# Patient Record
Sex: FEMALE | URBAN - METROPOLITAN AREA
[De-identification: names, ages, dates, MRNs, and addresses within clinical notes are randomized per-mention and may not be internally consistent; named-entity substitution may affect disease eponyms.]

---

## 2024-02-27 ENCOUNTER — LAB REQUISITION (OUTPATIENT)
Dept: LAB | Facility: CLINIC | Age: 79
End: 2024-02-27

## 2024-02-27 DIAGNOSIS — I48.0 PAROXYSMAL ATRIAL FIBRILLATION (H): ICD-10-CM

## 2024-02-28 ENCOUNTER — LAB REQUISITION (OUTPATIENT)
Dept: LAB | Facility: CLINIC | Age: 79
End: 2024-02-28
Payer: COMMERCIAL

## 2024-02-28 DIAGNOSIS — I48.0 PAROXYSMAL ATRIAL FIBRILLATION (H): ICD-10-CM

## 2024-02-28 LAB — INR PPP: 2.09 (ref 0.85–1.15)

## 2024-02-28 PROCEDURE — P9604 ONE-WAY ALLOW PRORATED TRIP: HCPCS | Mod: ORL | Performed by: FAMILY MEDICINE

## 2024-02-28 PROCEDURE — 85610 PROTHROMBIN TIME: CPT | Mod: ORL | Performed by: FAMILY MEDICINE

## 2024-02-28 PROCEDURE — 36415 COLL VENOUS BLD VENIPUNCTURE: CPT | Mod: ORL | Performed by: FAMILY MEDICINE

## 2024-03-12 ENCOUNTER — LAB REQUISITION (OUTPATIENT)
Dept: LAB | Facility: CLINIC | Age: 79
End: 2024-03-12
Payer: COMMERCIAL

## 2024-03-12 DIAGNOSIS — Z79.01 LONG TERM (CURRENT) USE OF ANTICOAGULANTS: ICD-10-CM

## 2024-03-13 LAB — INR PPP: 1.38 (ref 0.85–1.15)

## 2024-03-13 PROCEDURE — 36415 COLL VENOUS BLD VENIPUNCTURE: CPT | Mod: ORL | Performed by: FAMILY MEDICINE

## 2024-03-13 PROCEDURE — 85610 PROTHROMBIN TIME: CPT | Mod: ORL | Performed by: FAMILY MEDICINE

## 2024-03-13 PROCEDURE — P9604 ONE-WAY ALLOW PRORATED TRIP: HCPCS | Mod: ORL | Performed by: FAMILY MEDICINE

## 2024-03-19 ENCOUNTER — LAB REQUISITION (OUTPATIENT)
Dept: LAB | Facility: CLINIC | Age: 79
End: 2024-03-19
Payer: COMMERCIAL

## 2024-03-19 DIAGNOSIS — I48.92 UNSPECIFIED ATRIAL FLUTTER (H): ICD-10-CM

## 2024-03-20 LAB — INR PPP: 1.99 (ref 0.85–1.15)

## 2024-03-20 PROCEDURE — 36415 COLL VENOUS BLD VENIPUNCTURE: CPT | Mod: ORL | Performed by: FAMILY MEDICINE

## 2024-03-20 PROCEDURE — 85610 PROTHROMBIN TIME: CPT | Mod: ORL | Performed by: FAMILY MEDICINE

## 2024-03-20 PROCEDURE — P9604 ONE-WAY ALLOW PRORATED TRIP: HCPCS | Mod: ORL | Performed by: FAMILY MEDICINE

## 2024-03-26 ENCOUNTER — LAB REQUISITION (OUTPATIENT)
Dept: LAB | Facility: CLINIC | Age: 79
End: 2024-03-26
Payer: COMMERCIAL

## 2024-03-26 DIAGNOSIS — I48.21 PERMANENT ATRIAL FIBRILLATION (H): ICD-10-CM

## 2024-03-27 LAB — INR PPP: 1.67 (ref 0.85–1.15)

## 2024-03-27 PROCEDURE — 36415 COLL VENOUS BLD VENIPUNCTURE: CPT | Mod: ORL | Performed by: FAMILY MEDICINE

## 2024-03-27 PROCEDURE — 85610 PROTHROMBIN TIME: CPT | Mod: ORL | Performed by: FAMILY MEDICINE

## 2024-03-27 PROCEDURE — P9603 ONE-WAY ALLOW PRORATED MILES: HCPCS | Mod: ORL | Performed by: FAMILY MEDICINE

## 2024-03-31 ENCOUNTER — LAB REQUISITION (OUTPATIENT)
Dept: LAB | Facility: CLINIC | Age: 79
End: 2024-03-31
Payer: COMMERCIAL

## 2024-03-31 ENCOUNTER — CONTACT MOVED (OUTPATIENT)
Age: 79
End: 2024-03-31
Payer: COMMERCIAL

## 2024-03-31 DIAGNOSIS — I48.0 PAROXYSMAL ATRIAL FIBRILLATION (H): ICD-10-CM

## 2024-04-03 LAB — INR PPP: 1.94 (ref 0.85–1.15)

## 2024-04-03 PROCEDURE — 85610 PROTHROMBIN TIME: CPT | Mod: ORL | Performed by: FAMILY MEDICINE

## 2024-04-03 PROCEDURE — P9604 ONE-WAY ALLOW PRORATED TRIP: HCPCS | Mod: ORL | Performed by: FAMILY MEDICINE

## 2024-04-03 PROCEDURE — 36415 COLL VENOUS BLD VENIPUNCTURE: CPT | Mod: ORL | Performed by: FAMILY MEDICINE

## 2024-04-09 ENCOUNTER — LAB REQUISITION (OUTPATIENT)
Dept: LAB | Facility: CLINIC | Age: 79
End: 2024-04-09
Payer: COMMERCIAL

## 2024-04-09 DIAGNOSIS — Z79.01 LONG TERM (CURRENT) USE OF ANTICOAGULANTS: ICD-10-CM

## 2024-04-09 DIAGNOSIS — I48.0 PAROXYSMAL ATRIAL FIBRILLATION (H): ICD-10-CM

## 2024-04-10 LAB — INR PPP: 4.25 (ref 0.85–1.15)

## 2024-04-10 PROCEDURE — P9603 ONE-WAY ALLOW PRORATED MILES: HCPCS | Mod: ORL | Performed by: FAMILY MEDICINE

## 2024-04-10 PROCEDURE — 36415 COLL VENOUS BLD VENIPUNCTURE: CPT | Mod: ORL | Performed by: FAMILY MEDICINE

## 2024-04-10 PROCEDURE — 85610 PROTHROMBIN TIME: CPT | Mod: ORL | Performed by: FAMILY MEDICINE

## 2024-04-16 ENCOUNTER — LAB REQUISITION (OUTPATIENT)
Dept: LAB | Facility: CLINIC | Age: 79
End: 2024-04-16
Payer: COMMERCIAL

## 2024-04-16 DIAGNOSIS — I48.0 PAROXYSMAL ATRIAL FIBRILLATION (H): ICD-10-CM

## 2024-04-17 LAB — INR PPP: 4.19 (ref 0.85–1.15)

## 2024-04-17 PROCEDURE — 36415 COLL VENOUS BLD VENIPUNCTURE: CPT | Mod: ORL | Performed by: FAMILY MEDICINE

## 2024-04-17 PROCEDURE — 85610 PROTHROMBIN TIME: CPT | Mod: ORL | Performed by: FAMILY MEDICINE

## 2024-04-17 PROCEDURE — P9604 ONE-WAY ALLOW PRORATED TRIP: HCPCS | Mod: ORL | Performed by: FAMILY MEDICINE

## 2024-04-23 ENCOUNTER — LAB REQUISITION (OUTPATIENT)
Dept: LAB | Facility: CLINIC | Age: 79
End: 2024-04-23
Payer: COMMERCIAL

## 2024-04-23 DIAGNOSIS — I48.0 PAROXYSMAL ATRIAL FIBRILLATION (H): ICD-10-CM

## 2024-04-24 LAB — INR PPP: 4.4 (ref 0.85–1.15)

## 2024-04-24 PROCEDURE — 85610 PROTHROMBIN TIME: CPT | Mod: ORL | Performed by: FAMILY MEDICINE

## 2024-04-24 PROCEDURE — P9604 ONE-WAY ALLOW PRORATED TRIP: HCPCS | Mod: ORL | Performed by: FAMILY MEDICINE

## 2024-04-24 PROCEDURE — 36415 COLL VENOUS BLD VENIPUNCTURE: CPT | Mod: ORL | Performed by: FAMILY MEDICINE

## 2024-04-30 ENCOUNTER — LAB REQUISITION (OUTPATIENT)
Dept: LAB | Facility: CLINIC | Age: 79
End: 2024-04-30
Payer: COMMERCIAL

## 2024-04-30 DIAGNOSIS — Z79.01 LONG TERM (CURRENT) USE OF ANTICOAGULANTS: ICD-10-CM

## 2024-05-01 LAB — INR PPP: 2.29 (ref 0.85–1.15)

## 2024-05-01 PROCEDURE — 36415 COLL VENOUS BLD VENIPUNCTURE: CPT | Mod: ORL | Performed by: FAMILY MEDICINE

## 2024-05-01 PROCEDURE — 85610 PROTHROMBIN TIME: CPT | Mod: ORL | Performed by: FAMILY MEDICINE

## 2024-05-01 PROCEDURE — P9604 ONE-WAY ALLOW PRORATED TRIP: HCPCS | Mod: ORL | Performed by: FAMILY MEDICINE

## 2024-05-07 ENCOUNTER — LAB REQUISITION (OUTPATIENT)
Dept: LAB | Facility: CLINIC | Age: 79
End: 2024-05-07
Payer: COMMERCIAL

## 2024-05-07 DIAGNOSIS — Z79.01 LONG TERM (CURRENT) USE OF ANTICOAGULANTS: ICD-10-CM

## 2024-05-08 LAB — INR PPP: 2.76 (ref 0.85–1.15)

## 2024-05-08 PROCEDURE — P9604 ONE-WAY ALLOW PRORATED TRIP: HCPCS | Mod: ORL | Performed by: FAMILY MEDICINE

## 2024-05-08 PROCEDURE — 85610 PROTHROMBIN TIME: CPT | Mod: ORL | Performed by: FAMILY MEDICINE

## 2024-05-08 PROCEDURE — 36415 COLL VENOUS BLD VENIPUNCTURE: CPT | Mod: ORL | Performed by: FAMILY MEDICINE

## 2024-05-21 ENCOUNTER — LAB REQUISITION (OUTPATIENT)
Dept: LAB | Facility: CLINIC | Age: 79
End: 2024-05-21
Payer: COMMERCIAL

## 2024-05-21 DIAGNOSIS — Z79.01 LONG TERM (CURRENT) USE OF ANTICOAGULANTS: ICD-10-CM

## 2024-05-22 LAB — INR PPP: 2 (ref 0.85–1.15)

## 2024-05-22 PROCEDURE — P9603 ONE-WAY ALLOW PRORATED MILES: HCPCS | Mod: ORL | Performed by: FAMILY MEDICINE

## 2024-05-22 PROCEDURE — 85610 PROTHROMBIN TIME: CPT | Mod: ORL | Performed by: FAMILY MEDICINE

## 2024-05-22 PROCEDURE — 36415 COLL VENOUS BLD VENIPUNCTURE: CPT | Mod: ORL | Performed by: FAMILY MEDICINE

## 2024-06-07 ENCOUNTER — LAB REQUISITION (OUTPATIENT)
Dept: LAB | Facility: CLINIC | Age: 79
End: 2024-06-07
Payer: COMMERCIAL

## 2024-06-07 DIAGNOSIS — I48.0 PAROXYSMAL ATRIAL FIBRILLATION (H): ICD-10-CM

## 2024-06-10 LAB — INR PPP: 2.22 (ref 0.85–1.15)

## 2024-06-10 PROCEDURE — P9603 ONE-WAY ALLOW PRORATED MILES: HCPCS | Mod: ORL | Performed by: OBSTETRICS & GYNECOLOGY

## 2024-06-10 PROCEDURE — 85610 PROTHROMBIN TIME: CPT | Mod: ORL | Performed by: OBSTETRICS & GYNECOLOGY

## 2024-06-10 PROCEDURE — 36415 COLL VENOUS BLD VENIPUNCTURE: CPT | Mod: ORL | Performed by: OBSTETRICS & GYNECOLOGY

## 2024-06-14 ENCOUNTER — LAB REQUISITION (OUTPATIENT)
Dept: LAB | Facility: CLINIC | Age: 79
End: 2024-06-14
Payer: COMMERCIAL

## 2024-06-14 DIAGNOSIS — I48.0 PAROXYSMAL ATRIAL FIBRILLATION (H): ICD-10-CM

## 2024-06-17 LAB — INR PPP: 2.06 (ref 0.85–1.15)

## 2024-06-17 PROCEDURE — P9603 ONE-WAY ALLOW PRORATED MILES: HCPCS | Mod: ORL | Performed by: FAMILY MEDICINE

## 2024-06-17 PROCEDURE — 85610 PROTHROMBIN TIME: CPT | Mod: ORL | Performed by: FAMILY MEDICINE

## 2024-06-17 PROCEDURE — 36415 COLL VENOUS BLD VENIPUNCTURE: CPT | Mod: ORL | Performed by: FAMILY MEDICINE

## 2024-06-27 ENCOUNTER — LAB REQUISITION (OUTPATIENT)
Dept: LAB | Facility: CLINIC | Age: 79
End: 2024-06-27
Payer: COMMERCIAL

## 2024-06-27 DIAGNOSIS — I48.0 PAROXYSMAL ATRIAL FIBRILLATION (H): ICD-10-CM

## 2024-07-01 LAB — INR PPP: 2.11 (ref 0.85–1.15)

## 2024-07-01 PROCEDURE — 85610 PROTHROMBIN TIME: CPT | Mod: ORL | Performed by: OBSTETRICS & GYNECOLOGY

## 2024-07-01 PROCEDURE — 36415 COLL VENOUS BLD VENIPUNCTURE: CPT | Mod: ORL | Performed by: OBSTETRICS & GYNECOLOGY

## 2024-07-01 PROCEDURE — P9603 ONE-WAY ALLOW PRORATED MILES: HCPCS | Mod: ORL | Performed by: OBSTETRICS & GYNECOLOGY

## 2024-07-24 ENCOUNTER — LAB REQUISITION (OUTPATIENT)
Dept: LAB | Facility: CLINIC | Age: 79
End: 2024-07-24
Payer: COMMERCIAL

## 2024-07-24 DIAGNOSIS — I48.0 PAROXYSMAL ATRIAL FIBRILLATION (H): ICD-10-CM

## 2024-07-29 LAB — INR PPP: 2.3 (ref 0.85–1.15)

## 2024-07-29 PROCEDURE — 85610 PROTHROMBIN TIME: CPT | Mod: ORL | Performed by: FAMILY MEDICINE

## 2024-07-29 PROCEDURE — P9603 ONE-WAY ALLOW PRORATED MILES: HCPCS | Mod: ORL | Performed by: FAMILY MEDICINE

## 2024-07-29 PROCEDURE — 36415 COLL VENOUS BLD VENIPUNCTURE: CPT | Mod: ORL | Performed by: FAMILY MEDICINE

## 2024-08-22 ENCOUNTER — LAB REQUISITION (OUTPATIENT)
Dept: LAB | Facility: CLINIC | Age: 79
End: 2024-08-22
Payer: COMMERCIAL

## 2024-08-22 DIAGNOSIS — I48.0 PAROXYSMAL ATRIAL FIBRILLATION (H): ICD-10-CM

## 2024-08-26 LAB — INR PPP: 2.62 (ref 0.85–1.15)

## 2024-08-26 PROCEDURE — P9604 ONE-WAY ALLOW PRORATED TRIP: HCPCS | Mod: ORL | Performed by: FAMILY MEDICINE

## 2024-08-26 PROCEDURE — 85610 PROTHROMBIN TIME: CPT | Mod: ORL | Performed by: FAMILY MEDICINE

## 2024-08-26 PROCEDURE — 36415 COLL VENOUS BLD VENIPUNCTURE: CPT | Mod: ORL | Performed by: FAMILY MEDICINE

## 2024-09-03 ENCOUNTER — LAB REQUISITION (OUTPATIENT)
Dept: LAB | Facility: CLINIC | Age: 79
End: 2024-09-03
Payer: COMMERCIAL

## 2024-09-03 DIAGNOSIS — E87.6 HYPOKALEMIA: ICD-10-CM

## 2024-09-03 DIAGNOSIS — R76.12 NONSPECIFIC REACTION TO CELL MEDIATED IMMUNITY MEASUREMENT OF GAMMA INTERFERON ANTIGEN RESPONSE WITHOUT ACTIVE TUBERCULOSIS: ICD-10-CM

## 2024-09-04 LAB
ANION GAP SERPL CALCULATED.3IONS-SCNC: 8 MMOL/L (ref 7–15)
BUN SERPL-MCNC: 16.2 MG/DL (ref 8–23)
CALCIUM SERPL-MCNC: 9.7 MG/DL (ref 8.8–10.4)
CHLORIDE SERPL-SCNC: 101 MMOL/L (ref 98–107)
CREAT SERPL-MCNC: 0.61 MG/DL (ref 0.51–0.95)
EGFRCR SERPLBLD CKD-EPI 2021: >90 ML/MIN/1.73M2
GLUCOSE SERPL-MCNC: 124 MG/DL (ref 70–99)
HCO3 SERPL-SCNC: 25 MMOL/L (ref 22–29)
POTASSIUM SERPL-SCNC: 4.4 MMOL/L (ref 3.4–5.3)
SODIUM SERPL-SCNC: 134 MMOL/L (ref 135–145)

## 2024-09-04 PROCEDURE — 86481 TB AG RESPONSE T-CELL SUSP: CPT | Mod: ORL | Performed by: FAMILY MEDICINE

## 2024-09-04 PROCEDURE — 80048 BASIC METABOLIC PNL TOTAL CA: CPT | Mod: ORL | Performed by: FAMILY MEDICINE

## 2024-09-04 PROCEDURE — 36415 COLL VENOUS BLD VENIPUNCTURE: CPT | Mod: ORL | Performed by: FAMILY MEDICINE

## 2024-09-04 PROCEDURE — P9603 ONE-WAY ALLOW PRORATED MILES: HCPCS | Mod: ORL | Performed by: FAMILY MEDICINE

## 2024-09-05 LAB
GAMMA INTERFERON BACKGROUND BLD IA-ACNC: 0 IU/ML
M TB IFN-G BLD-IMP: ABNORMAL
M TB IFN-G CD4+ BCKGRND COR BLD-ACNC: 0.21 IU/ML
MITOGEN IGNF BCKGRD COR BLD-ACNC: 0 IU/ML
MITOGEN IGNF BCKGRD COR BLD-ACNC: 0 IU/ML
QUANTIFERON MITOGEN: 0.21 IU/ML
QUANTIFERON NIL TUBE: 0 IU/ML
QUANTIFERON TB1 TUBE: 0 IU/ML
QUANTIFERON TB2 TUBE: 0

## 2024-09-06 ENCOUNTER — LAB REQUISITION (OUTPATIENT)
Dept: LAB | Facility: CLINIC | Age: 79
End: 2024-09-06
Payer: COMMERCIAL

## 2024-09-06 DIAGNOSIS — A15.0 TUBERCULOSIS OF LUNG: ICD-10-CM

## 2024-09-09 PROCEDURE — 86481 TB AG RESPONSE T-CELL SUSP: CPT | Mod: ORL | Performed by: FAMILY MEDICINE

## 2024-09-09 PROCEDURE — 36415 COLL VENOUS BLD VENIPUNCTURE: CPT | Mod: ORL | Performed by: FAMILY MEDICINE

## 2024-09-09 PROCEDURE — P9603 ONE-WAY ALLOW PRORATED MILES: HCPCS | Mod: ORL | Performed by: FAMILY MEDICINE

## 2024-09-10 ENCOUNTER — LAB REQUISITION (OUTPATIENT)
Dept: LAB | Facility: CLINIC | Age: 79
End: 2024-09-10
Payer: COMMERCIAL

## 2024-09-10 DIAGNOSIS — D64.9 ANEMIA, UNSPECIFIED: ICD-10-CM

## 2024-09-10 LAB
GAMMA INTERFERON BACKGROUND BLD IA-ACNC: 0.06 IU/ML
M TB IFN-G BLD-IMP: NEGATIVE
M TB IFN-G CD4+ BCKGRND COR BLD-ACNC: 1 IU/ML
MITOGEN IGNF BCKGRD COR BLD-ACNC: 0 IU/ML
MITOGEN IGNF BCKGRD COR BLD-ACNC: 0 IU/ML
QUANTIFERON MITOGEN: 1.06 IU/ML
QUANTIFERON NIL TUBE: 0.06 IU/ML
QUANTIFERON TB1 TUBE: 0.06 IU/ML
QUANTIFERON TB2 TUBE: 0.06

## 2024-09-11 LAB
ERYTHROCYTE [DISTWIDTH] IN BLOOD BY AUTOMATED COUNT: 14.7 % (ref 10–15)
HCT VFR BLD AUTO: 36.7 % (ref 35–47)
HGB BLD-MCNC: 11.5 G/DL (ref 11.7–15.7)
MCH RBC QN AUTO: 32 PG (ref 26.5–33)
MCHC RBC AUTO-ENTMCNC: 31.3 G/DL (ref 31.5–36.5)
MCV RBC AUTO: 102 FL (ref 78–100)
PLATELET # BLD AUTO: 176 10E3/UL (ref 150–450)
RBC # BLD AUTO: 3.59 10E6/UL (ref 3.8–5.2)
WBC # BLD AUTO: 4.8 10E3/UL (ref 4–11)

## 2024-09-11 PROCEDURE — 85027 COMPLETE CBC AUTOMATED: CPT | Mod: ORL | Performed by: INTERNAL MEDICINE

## 2024-09-11 PROCEDURE — 36415 COLL VENOUS BLD VENIPUNCTURE: CPT | Mod: ORL | Performed by: INTERNAL MEDICINE

## 2024-09-11 PROCEDURE — P9603 ONE-WAY ALLOW PRORATED MILES: HCPCS | Mod: ORL | Performed by: INTERNAL MEDICINE

## 2024-09-13 ENCOUNTER — LAB REQUISITION (OUTPATIENT)
Dept: LAB | Facility: CLINIC | Age: 79
End: 2024-09-13
Payer: COMMERCIAL

## 2024-09-13 DIAGNOSIS — I48.20 CHRONIC ATRIAL FIBRILLATION, UNSPECIFIED (H): ICD-10-CM

## 2024-09-14 ENCOUNTER — LAB REQUISITION (OUTPATIENT)
Dept: LAB | Facility: CLINIC | Age: 79
End: 2024-09-14
Payer: COMMERCIAL

## 2024-09-14 DIAGNOSIS — I50.9 HEART FAILURE, UNSPECIFIED (H): ICD-10-CM

## 2024-09-14 LAB — INR PPP: 3.73 (ref 0.85–1.15)

## 2024-09-14 PROCEDURE — 85610 PROTHROMBIN TIME: CPT | Mod: ORL | Performed by: INTERNAL MEDICINE

## 2024-09-16 LAB — HOLD SPECIMEN: NORMAL

## 2024-09-16 PROCEDURE — 36415 COLL VENOUS BLD VENIPUNCTURE: CPT | Mod: ORL | Performed by: INTERNAL MEDICINE

## 2024-09-18 ENCOUNTER — LAB REQUISITION (OUTPATIENT)
Dept: LAB | Facility: CLINIC | Age: 79
End: 2024-09-18
Payer: COMMERCIAL

## 2024-09-18 DIAGNOSIS — I48.0 PAROXYSMAL ATRIAL FIBRILLATION (H): ICD-10-CM

## 2024-09-19 ENCOUNTER — LAB REQUISITION (OUTPATIENT)
Dept: LAB | Facility: CLINIC | Age: 79
End: 2024-09-19
Payer: COMMERCIAL

## 2024-09-19 DIAGNOSIS — I48.20 CHRONIC ATRIAL FIBRILLATION, UNSPECIFIED (H): ICD-10-CM

## 2024-09-20 LAB — INR PPP: 1.43 (ref 0.85–1.15)

## 2024-09-20 PROCEDURE — P9603 ONE-WAY ALLOW PRORATED MILES: HCPCS | Mod: ORL

## 2024-09-20 PROCEDURE — 85610 PROTHROMBIN TIME: CPT | Mod: ORL

## 2024-09-20 PROCEDURE — 36415 COLL VENOUS BLD VENIPUNCTURE: CPT | Mod: ORL

## 2024-09-22 ENCOUNTER — LAB REQUISITION (OUTPATIENT)
Dept: LAB | Facility: CLINIC | Age: 79
End: 2024-09-22
Payer: COMMERCIAL

## 2024-09-22 DIAGNOSIS — R30.0 DYSURIA: ICD-10-CM

## 2024-09-22 PROCEDURE — 81001 URINALYSIS AUTO W/SCOPE: CPT | Mod: ORL | Performed by: INTERNAL MEDICINE

## 2024-09-23 LAB
ALBUMIN UR-MCNC: NEGATIVE MG/DL
APPEARANCE UR: CLEAR
BACTERIA #/AREA URNS HPF: ABNORMAL /HPF
BILIRUB UR QL STRIP: NEGATIVE
COLOR UR AUTO: YELLOW
GLUCOSE UR STRIP-MCNC: >=1000 MG/DL
HGB UR QL STRIP: NEGATIVE
KETONES UR STRIP-MCNC: NEGATIVE MG/DL
LEUKOCYTE ESTERASE UR QL STRIP: NEGATIVE
NITRATE UR QL: NEGATIVE
PH UR STRIP: 6 [PH] (ref 5–7)
RBC URINE: 1 /HPF
SP GR UR STRIP: 1.03 (ref 1–1.03)
SQUAMOUS EPITHELIAL: <1 /HPF
UROBILINOGEN UR STRIP-MCNC: NORMAL MG/DL
WBC URINE: 9 /HPF

## 2024-10-02 ENCOUNTER — LAB REQUISITION (OUTPATIENT)
Dept: LAB | Facility: CLINIC | Age: 79
End: 2024-10-02
Payer: COMMERCIAL

## 2024-10-02 DIAGNOSIS — I48.0 PAROXYSMAL ATRIAL FIBRILLATION (H): ICD-10-CM

## 2024-10-07 LAB — INR PPP: 4.07 (ref 0.85–1.15)

## 2024-10-07 PROCEDURE — P9604 ONE-WAY ALLOW PRORATED TRIP: HCPCS | Mod: ORL | Performed by: FAMILY MEDICINE

## 2024-10-07 PROCEDURE — 85610 PROTHROMBIN TIME: CPT | Mod: ORL | Performed by: FAMILY MEDICINE

## 2024-10-07 PROCEDURE — 36415 COLL VENOUS BLD VENIPUNCTURE: CPT | Mod: ORL | Performed by: FAMILY MEDICINE

## 2024-10-09 ENCOUNTER — LAB REQUISITION (OUTPATIENT)
Dept: LAB | Facility: CLINIC | Age: 79
End: 2024-10-09
Payer: COMMERCIAL

## 2024-10-09 DIAGNOSIS — I48.0 PAROXYSMAL ATRIAL FIBRILLATION (H): ICD-10-CM

## 2024-10-14 LAB — INR PPP: 3.86 (ref 0.85–1.15)

## 2024-10-14 PROCEDURE — 36415 COLL VENOUS BLD VENIPUNCTURE: CPT | Mod: ORL | Performed by: FAMILY MEDICINE

## 2024-10-14 PROCEDURE — 85610 PROTHROMBIN TIME: CPT | Mod: ORL | Performed by: FAMILY MEDICINE

## 2024-10-14 PROCEDURE — P9603 ONE-WAY ALLOW PRORATED MILES: HCPCS | Mod: ORL | Performed by: FAMILY MEDICINE

## 2024-10-16 ENCOUNTER — LAB REQUISITION (OUTPATIENT)
Dept: LAB | Facility: CLINIC | Age: 79
End: 2024-10-16
Payer: COMMERCIAL

## 2024-10-16 DIAGNOSIS — I48.0 PAROXYSMAL ATRIAL FIBRILLATION (H): ICD-10-CM

## 2024-10-21 LAB — INR PPP: 3.08 (ref 0.85–1.15)

## 2024-10-21 PROCEDURE — P9603 ONE-WAY ALLOW PRORATED MILES: HCPCS | Mod: ORL | Performed by: FAMILY MEDICINE

## 2024-10-21 PROCEDURE — 36415 COLL VENOUS BLD VENIPUNCTURE: CPT | Mod: ORL | Performed by: FAMILY MEDICINE

## 2024-10-21 PROCEDURE — 85610 PROTHROMBIN TIME: CPT | Mod: ORL | Performed by: FAMILY MEDICINE

## 2024-10-30 ENCOUNTER — LAB REQUISITION (OUTPATIENT)
Dept: LAB | Facility: CLINIC | Age: 79
End: 2024-10-30
Payer: COMMERCIAL

## 2024-10-30 DIAGNOSIS — I48.0 PAROXYSMAL ATRIAL FIBRILLATION (H): ICD-10-CM

## 2024-11-04 LAB — INR PPP: 2.03 (ref 0.85–1.15)

## 2024-11-04 PROCEDURE — P9604 ONE-WAY ALLOW PRORATED TRIP: HCPCS | Mod: ORL | Performed by: FAMILY MEDICINE

## 2024-11-04 PROCEDURE — 85610 PROTHROMBIN TIME: CPT | Mod: ORL | Performed by: FAMILY MEDICINE

## 2024-11-04 PROCEDURE — 36415 COLL VENOUS BLD VENIPUNCTURE: CPT | Mod: ORL | Performed by: FAMILY MEDICINE

## 2024-11-26 ENCOUNTER — LAB REQUISITION (OUTPATIENT)
Dept: LAB | Facility: CLINIC | Age: 79
End: 2024-11-26
Payer: COMMERCIAL

## 2024-11-26 DIAGNOSIS — I48.0 PAROXYSMAL ATRIAL FIBRILLATION (H): ICD-10-CM

## 2024-12-02 LAB — INR PPP: 4.59 (ref 0.85–1.15)

## 2024-12-02 PROCEDURE — 85610 PROTHROMBIN TIME: CPT | Mod: ORL | Performed by: FAMILY MEDICINE

## 2024-12-02 PROCEDURE — P9603 ONE-WAY ALLOW PRORATED MILES: HCPCS | Mod: ORL | Performed by: FAMILY MEDICINE

## 2024-12-02 PROCEDURE — 36415 COLL VENOUS BLD VENIPUNCTURE: CPT | Mod: ORL | Performed by: FAMILY MEDICINE

## 2024-12-05 ENCOUNTER — LAB REQUISITION (OUTPATIENT)
Dept: LAB | Facility: CLINIC | Age: 79
End: 2024-12-05
Payer: COMMERCIAL

## 2024-12-05 DIAGNOSIS — I48.0 PAROXYSMAL ATRIAL FIBRILLATION (H): ICD-10-CM

## 2024-12-09 LAB — INR PPP: 2.5 (ref 0.85–1.15)

## 2024-12-09 PROCEDURE — 85610 PROTHROMBIN TIME: CPT | Mod: ORL | Performed by: FAMILY MEDICINE

## 2024-12-09 PROCEDURE — P9603 ONE-WAY ALLOW PRORATED MILES: HCPCS | Mod: ORL | Performed by: FAMILY MEDICINE

## 2024-12-09 PROCEDURE — 36415 COLL VENOUS BLD VENIPUNCTURE: CPT | Mod: ORL | Performed by: FAMILY MEDICINE

## 2024-12-18 ENCOUNTER — LAB REQUISITION (OUTPATIENT)
Dept: LAB | Facility: CLINIC | Age: 79
End: 2024-12-18
Payer: COMMERCIAL

## 2024-12-18 DIAGNOSIS — I48.0 PAROXYSMAL ATRIAL FIBRILLATION (H): ICD-10-CM

## 2024-12-23 LAB — INR PPP: 1.56 (ref 0.85–1.15)

## 2024-12-23 PROCEDURE — 36415 COLL VENOUS BLD VENIPUNCTURE: CPT | Mod: ORL | Performed by: FAMILY MEDICINE

## 2024-12-23 PROCEDURE — P9603 ONE-WAY ALLOW PRORATED MILES: HCPCS | Mod: ORL | Performed by: FAMILY MEDICINE

## 2024-12-23 PROCEDURE — 85610 PROTHROMBIN TIME: CPT | Mod: ORL | Performed by: FAMILY MEDICINE

## 2024-12-24 ENCOUNTER — LAB REQUISITION (OUTPATIENT)
Dept: LAB | Facility: CLINIC | Age: 79
End: 2024-12-24
Payer: COMMERCIAL

## 2024-12-24 DIAGNOSIS — I48.0 PAROXYSMAL ATRIAL FIBRILLATION (H): ICD-10-CM

## 2024-12-30 LAB — INR PPP: 1.72 (ref 0.85–1.15)

## 2024-12-30 PROCEDURE — 36415 COLL VENOUS BLD VENIPUNCTURE: CPT | Mod: ORL | Performed by: FAMILY MEDICINE

## 2024-12-30 PROCEDURE — 85610 PROTHROMBIN TIME: CPT | Mod: ORL | Performed by: FAMILY MEDICINE

## 2024-12-30 PROCEDURE — P9603 ONE-WAY ALLOW PRORATED MILES: HCPCS | Mod: ORL | Performed by: FAMILY MEDICINE

## 2025-01-02 ENCOUNTER — LAB REQUISITION (OUTPATIENT)
Dept: LAB | Facility: CLINIC | Age: 80
End: 2025-01-02
Payer: COMMERCIAL

## 2025-01-02 DIAGNOSIS — I48.0 PAROXYSMAL ATRIAL FIBRILLATION (H): ICD-10-CM

## 2025-01-06 LAB — INR PPP: 2.49 (ref 0.85–1.15)

## 2025-01-06 PROCEDURE — P9603 ONE-WAY ALLOW PRORATED MILES: HCPCS | Mod: ORL | Performed by: FAMILY MEDICINE

## 2025-01-06 PROCEDURE — 36415 COLL VENOUS BLD VENIPUNCTURE: CPT | Mod: ORL | Performed by: FAMILY MEDICINE

## 2025-01-06 PROCEDURE — 85610 PROTHROMBIN TIME: CPT | Mod: ORL | Performed by: FAMILY MEDICINE

## 2025-01-08 ENCOUNTER — LAB REQUISITION (OUTPATIENT)
Dept: LAB | Facility: CLINIC | Age: 80
End: 2025-01-08
Payer: COMMERCIAL

## 2025-01-08 DIAGNOSIS — I50.20 UNSPECIFIED SYSTOLIC (CONGESTIVE) HEART FAILURE (H): ICD-10-CM

## 2025-01-13 LAB
ANION GAP SERPL CALCULATED.3IONS-SCNC: 8 MMOL/L (ref 7–15)
BUN SERPL-MCNC: 11.5 MG/DL (ref 8–23)
CALCIUM SERPL-MCNC: 10.2 MG/DL (ref 8.8–10.4)
CHLORIDE SERPL-SCNC: 100 MMOL/L (ref 98–107)
CREAT SERPL-MCNC: 0.71 MG/DL (ref 0.51–0.95)
EGFRCR SERPLBLD CKD-EPI 2021: 86 ML/MIN/1.73M2
GLUCOSE SERPL-MCNC: 266 MG/DL (ref 70–99)
HCO3 SERPL-SCNC: 29 MMOL/L (ref 22–29)
POTASSIUM SERPL-SCNC: 4.8 MMOL/L (ref 3.4–5.3)
SODIUM SERPL-SCNC: 137 MMOL/L (ref 135–145)

## 2025-01-13 PROCEDURE — 80048 BASIC METABOLIC PNL TOTAL CA: CPT | Mod: ORL | Performed by: PHYSICIAN ASSISTANT

## 2025-01-13 PROCEDURE — P9603 ONE-WAY ALLOW PRORATED MILES: HCPCS | Mod: ORL | Performed by: PHYSICIAN ASSISTANT

## 2025-01-13 PROCEDURE — 36415 COLL VENOUS BLD VENIPUNCTURE: CPT | Mod: ORL | Performed by: PHYSICIAN ASSISTANT

## 2025-01-15 ENCOUNTER — LAB REQUISITION (OUTPATIENT)
Dept: LAB | Facility: CLINIC | Age: 80
End: 2025-01-15
Payer: COMMERCIAL

## 2025-01-15 DIAGNOSIS — I48.0 PAROXYSMAL ATRIAL FIBRILLATION (H): ICD-10-CM

## 2025-01-21 ENCOUNTER — LAB REQUISITION (OUTPATIENT)
Dept: LAB | Facility: CLINIC | Age: 80
End: 2025-01-21
Payer: COMMERCIAL

## 2025-01-21 DIAGNOSIS — I48.0 PAROXYSMAL ATRIAL FIBRILLATION (H): ICD-10-CM

## 2025-01-22 LAB — INR PPP: 2.22 (ref 0.85–1.15)

## 2025-01-22 PROCEDURE — 85610 PROTHROMBIN TIME: CPT | Mod: ORL | Performed by: FAMILY MEDICINE

## 2025-01-22 PROCEDURE — 36415 COLL VENOUS BLD VENIPUNCTURE: CPT | Mod: ORL | Performed by: FAMILY MEDICINE

## 2025-01-22 PROCEDURE — P9604 ONE-WAY ALLOW PRORATED TRIP: HCPCS | Mod: ORL | Performed by: FAMILY MEDICINE

## 2025-02-19 ENCOUNTER — LAB REQUISITION (OUTPATIENT)
Dept: LAB | Facility: CLINIC | Age: 80
End: 2025-02-19
Payer: COMMERCIAL

## 2025-02-19 DIAGNOSIS — I48.0 PAROXYSMAL ATRIAL FIBRILLATION (H): ICD-10-CM

## 2025-02-24 LAB — INR PPP: 2.84 (ref 0.85–1.15)

## 2025-02-24 PROCEDURE — 36415 COLL VENOUS BLD VENIPUNCTURE: CPT | Mod: ORL | Performed by: FAMILY MEDICINE

## 2025-02-24 PROCEDURE — 85610 PROTHROMBIN TIME: CPT | Mod: ORL | Performed by: FAMILY MEDICINE

## 2025-02-24 PROCEDURE — P9603 ONE-WAY ALLOW PRORATED MILES: HCPCS | Mod: ORL | Performed by: FAMILY MEDICINE

## 2025-02-27 ENCOUNTER — LAB REQUISITION (OUTPATIENT)
Dept: LAB | Facility: CLINIC | Age: 80
End: 2025-02-27
Payer: COMMERCIAL

## 2025-02-27 DIAGNOSIS — I48.0 PAROXYSMAL ATRIAL FIBRILLATION (H): ICD-10-CM

## 2025-03-03 LAB — INR PPP: 2.54 (ref 0.85–1.15)

## 2025-03-03 PROCEDURE — 36415 COLL VENOUS BLD VENIPUNCTURE: CPT | Mod: ORL | Performed by: FAMILY MEDICINE

## 2025-03-03 PROCEDURE — 85610 PROTHROMBIN TIME: CPT | Mod: ORL | Performed by: FAMILY MEDICINE

## 2025-03-03 PROCEDURE — P9603 ONE-WAY ALLOW PRORATED MILES: HCPCS | Mod: ORL | Performed by: FAMILY MEDICINE

## 2025-03-06 ENCOUNTER — LAB REQUISITION (OUTPATIENT)
Dept: LAB | Facility: CLINIC | Age: 80
End: 2025-03-06
Payer: COMMERCIAL

## 2025-03-06 DIAGNOSIS — I48.0 PAROXYSMAL ATRIAL FIBRILLATION (H): ICD-10-CM

## 2025-03-10 LAB — INR PPP: 2.94 (ref 0.85–1.15)

## 2025-03-10 PROCEDURE — 36415 COLL VENOUS BLD VENIPUNCTURE: CPT | Mod: ORL | Performed by: FAMILY MEDICINE

## 2025-03-10 PROCEDURE — P9604 ONE-WAY ALLOW PRORATED TRIP: HCPCS | Mod: ORL | Performed by: FAMILY MEDICINE

## 2025-03-10 PROCEDURE — 85610 PROTHROMBIN TIME: CPT | Mod: ORL | Performed by: FAMILY MEDICINE

## 2025-04-09 ENCOUNTER — LAB REQUISITION (OUTPATIENT)
Dept: LAB | Facility: CLINIC | Age: 80
End: 2025-04-09
Payer: COMMERCIAL

## 2025-04-09 DIAGNOSIS — I48.0 PAROXYSMAL ATRIAL FIBRILLATION (H): ICD-10-CM

## 2025-04-16 ENCOUNTER — LAB REQUISITION (OUTPATIENT)
Dept: LAB | Facility: CLINIC | Age: 80
End: 2025-04-16
Payer: COMMERCIAL

## 2025-04-16 DIAGNOSIS — I48.0 PAROXYSMAL ATRIAL FIBRILLATION (H): ICD-10-CM

## 2025-04-21 LAB
INR PPP: 1.79 (ref 0.85–1.15)
INR PPP: 1.79 (ref 0.85–1.15)

## 2025-04-21 PROCEDURE — P9603 ONE-WAY ALLOW PRORATED MILES: HCPCS | Mod: ORL | Performed by: FAMILY MEDICINE

## 2025-04-21 PROCEDURE — 85610 PROTHROMBIN TIME: CPT | Mod: ORL | Performed by: FAMILY MEDICINE

## 2025-04-21 PROCEDURE — 36415 COLL VENOUS BLD VENIPUNCTURE: CPT | Mod: ORL | Performed by: FAMILY MEDICINE

## 2025-04-22 ENCOUNTER — LAB REQUISITION (OUTPATIENT)
Dept: LAB | Facility: CLINIC | Age: 80
End: 2025-04-22
Payer: COMMERCIAL

## 2025-04-22 DIAGNOSIS — I48.0 PAROXYSMAL ATRIAL FIBRILLATION (H): ICD-10-CM

## 2025-04-23 ENCOUNTER — LAB REQUISITION (OUTPATIENT)
Dept: LAB | Facility: CLINIC | Age: 80
End: 2025-04-23
Payer: COMMERCIAL

## 2025-04-23 DIAGNOSIS — I48.0 PAROXYSMAL ATRIAL FIBRILLATION (H): ICD-10-CM

## 2025-04-28 LAB — INR PPP: 2.08 (ref 0.85–1.15)

## 2025-04-28 PROCEDURE — 85610 PROTHROMBIN TIME: CPT | Mod: ORL | Performed by: FAMILY MEDICINE

## 2025-04-28 PROCEDURE — 36415 COLL VENOUS BLD VENIPUNCTURE: CPT | Mod: ORL | Performed by: FAMILY MEDICINE

## 2025-04-28 PROCEDURE — P9603 ONE-WAY ALLOW PRORATED MILES: HCPCS | Mod: ORL | Performed by: FAMILY MEDICINE

## 2025-05-01 ENCOUNTER — LAB REQUISITION (OUTPATIENT)
Dept: LAB | Facility: CLINIC | Age: 80
End: 2025-05-01
Payer: COMMERCIAL

## 2025-05-01 DIAGNOSIS — I48.0 PAROXYSMAL ATRIAL FIBRILLATION (H): ICD-10-CM

## 2025-05-07 ENCOUNTER — LAB REQUISITION (OUTPATIENT)
Dept: LAB | Facility: CLINIC | Age: 80
End: 2025-05-07
Payer: COMMERCIAL

## 2025-05-07 DIAGNOSIS — I48.0 PAROXYSMAL ATRIAL FIBRILLATION (H): ICD-10-CM

## 2025-05-16 ENCOUNTER — LAB REQUISITION (OUTPATIENT)
Dept: LAB | Facility: CLINIC | Age: 80
End: 2025-05-16
Payer: COMMERCIAL

## 2025-05-16 DIAGNOSIS — I48.0 PAROXYSMAL ATRIAL FIBRILLATION (H): ICD-10-CM

## 2025-05-19 ENCOUNTER — HOSPITAL ENCOUNTER (EMERGENCY)
Facility: CLINIC | Age: 80
Discharge: HOME | End: 2025-05-20
Attending: EMERGENCY MEDICINE | Admitting: EMERGENCY MEDICINE
Payer: COMMERCIAL

## 2025-05-19 ENCOUNTER — APPOINTMENT (OUTPATIENT)
Dept: CT IMAGING | Facility: CLINIC | Age: 80
End: 2025-05-19
Attending: EMERGENCY MEDICINE
Payer: COMMERCIAL

## 2025-05-19 VITALS
TEMPERATURE: 98.3 F | SYSTOLIC BLOOD PRESSURE: 145 MMHG | OXYGEN SATURATION: 96 % | DIASTOLIC BLOOD PRESSURE: 63 MMHG | WEIGHT: 155 LBS | RESPIRATION RATE: 18 BRPM | HEART RATE: 62 BPM

## 2025-05-19 DIAGNOSIS — R10.9 ABDOMINAL PAIN, UNSPECIFIED ABDOMINAL LOCATION: ICD-10-CM

## 2025-05-19 LAB
ALBUMIN UR-MCNC: 10 MG/DL
ANION GAP SERPL CALCULATED.3IONS-SCNC: 5 MMOL/L (ref 7–15)
APPEARANCE UR: ABNORMAL
BACTERIA #/AREA URNS HPF: ABNORMAL /HPF
BASOPHILS # BLD AUTO: 0.1 10E3/UL (ref 0–0.2)
BASOPHILS NFR BLD AUTO: 1 %
BILIRUB UR QL STRIP: NEGATIVE
BUN SERPL-MCNC: 10.9 MG/DL (ref 8–23)
CALCIUM SERPL-MCNC: 9.9 MG/DL (ref 8.8–10.4)
CHLORIDE SERPL-SCNC: 99 MMOL/L (ref 98–107)
COLOR UR AUTO: ABNORMAL
CREAT SERPL-MCNC: 0.46 MG/DL (ref 0.51–0.95)
EGFRCR SERPLBLD CKD-EPI 2021: >90 ML/MIN/1.73M2
EOSINOPHIL # BLD AUTO: 0.2 10E3/UL (ref 0–0.7)
EOSINOPHIL NFR BLD AUTO: 3 %
ERYTHROCYTE [DISTWIDTH] IN BLOOD BY AUTOMATED COUNT: 14.6 % (ref 10–15)
GLUCOSE SERPL-MCNC: 193 MG/DL (ref 70–99)
GLUCOSE UR STRIP-MCNC: >1000 MG/DL
HCO3 SERPL-SCNC: 31 MMOL/L (ref 22–29)
HCT VFR BLD AUTO: 38.3 % (ref 35–47)
HGB BLD-MCNC: 12.8 G/DL (ref 11.7–15.7)
HGB UR QL STRIP: ABNORMAL
IMM GRANULOCYTES # BLD: 0.2 10E3/UL
IMM GRANULOCYTES NFR BLD: 2 %
INR PPP: 2.1 (ref 0.85–1.15)
KETONES UR STRIP-MCNC: NEGATIVE MG/DL
LEUKOCYTE ESTERASE UR QL STRIP: ABNORMAL
LYMPHOCYTES # BLD AUTO: 0.8 10E3/UL (ref 0.8–5.3)
LYMPHOCYTES NFR BLD AUTO: 12 %
MCH RBC QN AUTO: 33 PG (ref 26.5–33)
MCHC RBC AUTO-ENTMCNC: 33.4 G/DL (ref 31.5–36.5)
MCV RBC AUTO: 99 FL (ref 78–100)
MONOCYTES # BLD AUTO: 0.7 10E3/UL (ref 0–1.3)
MONOCYTES NFR BLD AUTO: 10 %
NEUTROPHILS # BLD AUTO: 4.7 10E3/UL (ref 1.6–8.3)
NEUTROPHILS NFR BLD AUTO: 71 %
NITRATE UR QL: NEGATIVE
NRBC # BLD AUTO: 0 10E3/UL
NRBC BLD AUTO-RTO: 0 /100
PH UR STRIP: 7 [PH] (ref 5–7)
PLATELET # BLD AUTO: 177 10E3/UL (ref 150–450)
POTASSIUM SERPL-SCNC: 4 MMOL/L (ref 3.4–5.3)
PROTHROMBIN TIME: 23.7 SECONDS (ref 11.8–14.8)
RBC # BLD AUTO: 3.88 10E6/UL (ref 3.8–5.2)
RBC URINE: 14 /HPF
SODIUM SERPL-SCNC: 135 MMOL/L (ref 135–145)
SP GR UR STRIP: 1.02 (ref 1–1.03)
UROBILINOGEN UR STRIP-MCNC: NORMAL MG/DL
WBC # BLD AUTO: 6.7 10E3/UL (ref 4–11)
WBC CLUMPS #/AREA URNS HPF: PRESENT /HPF
WBC URINE: 108 /HPF

## 2025-05-19 PROCEDURE — 99284 EMERGENCY DEPT VISIT MOD MDM: CPT | Performed by: EMERGENCY MEDICINE

## 2025-05-19 PROCEDURE — 36415 COLL VENOUS BLD VENIPUNCTURE: CPT | Performed by: EMERGENCY MEDICINE

## 2025-05-19 PROCEDURE — 74176 CT ABD & PELVIS W/O CONTRAST: CPT

## 2025-05-19 PROCEDURE — 85004 AUTOMATED DIFF WBC COUNT: CPT | Performed by: EMERGENCY MEDICINE

## 2025-05-19 PROCEDURE — 250N000011 HC RX IP 250 OP 636: Performed by: EMERGENCY MEDICINE

## 2025-05-19 PROCEDURE — 36415 COLL VENOUS BLD VENIPUNCTURE: CPT | Mod: ORL | Performed by: FAMILY MEDICINE

## 2025-05-19 PROCEDURE — 87186 SC STD MICRODIL/AGAR DIL: CPT | Performed by: EMERGENCY MEDICINE

## 2025-05-19 PROCEDURE — P9603 ONE-WAY ALLOW PRORATED MILES: HCPCS | Mod: ORL | Performed by: FAMILY MEDICINE

## 2025-05-19 PROCEDURE — 99285 EMERGENCY DEPT VISIT HI MDM: CPT | Mod: 25

## 2025-05-19 PROCEDURE — 81001 URINALYSIS AUTO W/SCOPE: CPT | Performed by: EMERGENCY MEDICINE

## 2025-05-19 PROCEDURE — 85610 PROTHROMBIN TIME: CPT | Mod: ORL | Performed by: FAMILY MEDICINE

## 2025-05-19 PROCEDURE — 250N000013 HC RX MED GY IP 250 OP 250 PS 637: Performed by: EMERGENCY MEDICINE

## 2025-05-19 PROCEDURE — 96365 THER/PROPH/DIAG IV INF INIT: CPT

## 2025-05-19 PROCEDURE — 80048 BASIC METABOLIC PNL TOTAL CA: CPT | Performed by: EMERGENCY MEDICINE

## 2025-05-19 RX ORDER — CEFTRIAXONE 1 G/1
1 INJECTION, POWDER, FOR SOLUTION INTRAMUSCULAR; INTRAVENOUS ONCE
Status: COMPLETED | OUTPATIENT
Start: 2025-05-19 | End: 2025-05-19

## 2025-05-19 RX ORDER — HYDROMORPHONE HYDROCHLORIDE 1 MG/ML
0.5 SOLUTION ORAL ONCE
Status: COMPLETED | OUTPATIENT
Start: 2025-05-19 | End: 2025-05-19

## 2025-05-19 RX ORDER — CIPROFLOXACIN 250 MG/1
250 TABLET, FILM COATED ORAL 2 TIMES DAILY
Qty: 14 TABLET | Refills: 0 | Status: SHIPPED | OUTPATIENT
Start: 2025-05-19 | End: 2025-05-26

## 2025-05-19 RX ADMIN — HYDROMORPHONE HYDROCHLORIDE 0.5 MG: 1 SOLUTION ORAL at 23:28

## 2025-05-19 RX ADMIN — CEFTRIAXONE SODIUM 1 G: 1 INJECTION, POWDER, FOR SOLUTION INTRAMUSCULAR; INTRAVENOUS at 22:56

## 2025-05-19 ASSESSMENT — ACTIVITIES OF DAILY LIVING (ADL)
ADLS_ACUITY_SCORE: 41
ADLS_ACUITY_SCORE: 41

## 2025-05-19 ASSESSMENT — COLUMBIA-SUICIDE SEVERITY RATING SCALE - C-SSRS
2. HAVE YOU ACTUALLY HAD ANY THOUGHTS OF KILLING YOURSELF IN THE PAST MONTH?: NO
6. HAVE YOU EVER DONE ANYTHING, STARTED TO DO ANYTHING, OR PREPARED TO DO ANYTHING TO END YOUR LIFE?: NO
1. IN THE PAST MONTH, HAVE YOU WISHED YOU WERE DEAD OR WISHED YOU COULD GO TO SLEEP AND NOT WAKE UP?: NO

## 2025-05-20 NOTE — ED NOTES
RN attempted to give Report and discharge instructions to [facility] X3 times without success in contacting nursing staff. Phone calls go straight to voice mail and/or are disconnected.

## 2025-05-20 NOTE — ED PROVIDER NOTES
History     Chief Complaint   Patient presents with    Abdominal Pain     HPI  Moira Duarte is a 79 year old female who presents for evaluation of lower abdominal pain.  She is coming from a memory care unit and not a good historian.  Brought in by paramedics and accompanied by her son who provides the history.  She has a history of left femur repair about 3 to 4 weeks ago.  After that she had a bowel obstruction type ileus picture it sounds like.  She has been having loose stools he reports.  No vomiting.  No measured fever he knew off.  He was concerned about recurrence of her diverticulitis.    Allergies:  Allergies   Allergen Reactions    Atorvastatin Other (See Comments)     ALLERGIC REACTION:  MUSCLE AND BODY ACHES   (lipitor)    Morphine Itching    Metformin Palpitations       Problem List:    There are no active problems to display for this patient.       Past Medical History:    No past medical history on file.    Past Surgical History:    No past surgical history on file.    Family History:    No family history on file.    Social History:  Marital Status:          Medications:    ciprofloxacin (CIPRO) 250 MG tablet          Review of Systems  All other systems are reviewed and are negative    Physical Exam   BP: (!) 158/70  Pulse: 62  Temp: 98.3  F (36.8  C)  Resp: 18  Weight: 70.3 kg (155 lb)  SpO2: 98 %      Physical Exam  Vitals and nursing note reviewed.   Constitutional:       General: She is not in acute distress.     Appearance: She is well-developed. She is not diaphoretic.   HENT:      Head: Normocephalic and atraumatic.      Nose: Nose normal.      Mouth/Throat:      Mouth: Mucous membranes are moist.      Pharynx: Oropharynx is clear.   Eyes:      General: No scleral icterus.     Conjunctiva/sclera: Conjunctivae normal.   Cardiovascular:      Rate and Rhythm: Normal rate and regular rhythm.      Heart sounds: Normal heart sounds. No murmur heard.  Pulmonary:      Effort: Pulmonary effort is  normal. No respiratory distress.      Breath sounds: No stridor. No wheezing or rales.   Abdominal:      General: Abdomen is flat. There is no distension.      Palpations: Abdomen is soft. There is no mass.      Tenderness: There is abdominal tenderness (moderate) in the left lower quadrant. There is no guarding or rebound.   Musculoskeletal:         General: No tenderness.      Cervical back: Normal range of motion and neck supple.   Skin:     General: Skin is warm and dry.      Coloration: Skin is not pale.      Findings: No erythema or rash.   Neurological:      General: No focal deficit present.      Mental Status: She is alert.   Psychiatric:         Mood and Affect: Mood normal.         ED Course        Procedures                  Results for orders placed or performed during the hospital encounter of 05/19/25 (from the past 24 hours)   CBC with platelets differential    Narrative    The following orders were created for panel order CBC with platelets differential.  Procedure                               Abnormality         Status                     ---------                               -----------         ------                     CBC with platelets and ...[[id number]]                      Final result                 Please view results for these tests on the individual orders.   Basic metabolic panel   Result Value Ref Range    Sodium 135 135 - 145 mmol/L    Potassium 4.0 3.4 - 5.3 mmol/L    Chloride 99 98 - 107 mmol/L    Carbon Dioxide (CO2) 31 (H) 22 - 29 mmol/L    Anion Gap 5 (L) 7 - 15 mmol/L    Urea Nitrogen 10.9 8.0 - 23.0 mg/dL    Creatinine 0.46 (L) 0.51 - 0.95 mg/dL    GFR Estimate >90 >60 mL/min/1.73m2    Calcium 9.9 8.8 - 10.4 mg/dL    Glucose 193 (H) 70 - 99 mg/dL   CBC with platelets and differential   Result Value Ref Range    WBC Count 6.7 4.0 - 11.0 10e3/uL    RBC Count 3.88 3.80 - 5.20 10e6/uL    Hemoglobin 12.8 11.7 - 15.7 g/dL    Hematocrit 38.3 35.0 - 47.0 %    MCV 99 78 - 100 fL     MCH 33.0 26.5 - 33.0 pg    MCHC 33.4 31.5 - 36.5 g/dL    RDW 14.6 10.0 - 15.0 %    Platelet Count 177 150 - 450 10e3/uL    % Neutrophils 71 %    % Lymphocytes 12 %    % Monocytes 10 %    % Eosinophils 3 %    % Basophils 1 %    % Immature Granulocytes 2 %    NRBCs per 100 WBC 0 <1 /100    Absolute Neutrophils 4.7 1.6 - 8.3 10e3/uL    Absolute Lymphocytes 0.8 0.8 - 5.3 10e3/uL    Absolute Monocytes 0.7 0.0 - 1.3 10e3/uL    Absolute Eosinophils 0.2 0.0 - 0.7 10e3/uL    Absolute Basophils 0.1 0.0 - 0.2 10e3/uL    Absolute Immature Granulocytes 0.2 <=0.4 10e3/uL    Absolute NRBCs 0.0 10e3/uL   UA with Microscopic reflex to Culture    Specimen: Urine, Lee Catheter   Result Value Ref Range    Color Urine Light Yellow Colorless, Straw, Light Yellow, Yellow    Appearance Urine Slightly Cloudy (A) Clear    Glucose Urine >1000 (A) Negative mg/dL    Bilirubin Urine Negative Negative    Ketones Urine Negative Negative mg/dL    Specific Gravity Urine 1.022 1.003 - 1.035    Blood Urine Small (A) Negative    pH Urine 7.0 5.0 - 7.0    Protein Albumin Urine 10 (A) Negative mg/dL    Urobilinogen Urine Normal Normal mg/dL    Nitrite Urine Negative Negative    Leukocyte Esterase Urine Large (A) Negative    Bacteria Urine Few (A) None Seen /HPF    WBC Clumps Urine Present (A) None Seen /HPF    RBC Urine 14 (H) <=2 /HPF    WBC Urine 108 (H) <=5 /HPF    Narrative    Urine Culture ordered based on laboratory criteria   CT Abdomen Pelvis w/o Contrast    Narrative    EXAM: CT ABDOMEN PELVIS W/O CONTRAST  LOCATION: [facility]  DATE: 5/19/2025    INDICATION: lower abd pain  COMPARISON: 5/11/2025  TECHNIQUE: CT scan of the abdomen and pelvis was performed without IV contrast. Multiplanar reformats were obtained. Dose reduction techniques were used.  CONTRAST: None.    FINDINGS:   LOWER CHEST: Normal.    HEPATOBILIARY: Cholelithiasis unchanged. Nodular cirrhotic appearance of liver, no focal mass  evident.    PANCREAS: No change. 1.3 cm bland appearing cyst at pancreatic head unchanged. No pancreatic inflammation.    SPLEEN: Normal.    ADRENAL GLANDS: Normal.    KIDNEYS/BLADDER: 4 mm stone left lower pole unchanged. Similar sized upper pole stone on prior exam is no longer visible but there is no hydronephrosis or ureteral stone evident.  Stable right kidney, no stones or hydronephrosis.  Lee catheter in place. There are 3 or 4 tiny 1 mm stone fragments within bladder.    BOWEL: Decrease size of focal stool ball within rectum. There remains mild rectal wall thickening but decrease in the perirectal soft tissue stranding. Extensive diverticulosis sigmoid colon unchanged, no definite acute diverticulitis. Right hemicolon   unremarkable.    LYMPH NODES: Normal.    VASCULATURE: Moderately heavy atherosclerotic calcifications.    PELVIC ORGANS: No pelvic mass or free fluid.    MUSCULOSKELETAL: Left hip fracture repair and old left obturator ring fractures. Degenerative changes throughout spine.      Impression    IMPRESSION:   1.  Decreased size of rectal stool ball with decrease inflammatory stranding surrounding rectum.  2.  Extensive sigmoid diverticulosis persists without definite evidence for acute diverticulitis.  3.  And upper pole left kidney stone is no longer visible but no hydronephrosis or ureteral stone evident. Stable left lower pole kidney stone.  4.  Tiny stone fragments are seen within the bladder.  5.  Cholelithiasis and cirrhosis unchanged.         Medications   cefTRIAXone (ROCEPHIN) 1 g vial to attach to  mL bag for ADULTS or NS 50 mL bag for PEDS (1 g Intravenous $New Bag 5/19/25 2567)   HYDROmorphone (STANDARD CONC) (DILAUDID) oral solution 0.5 mg (has no administration in time range)       Assessments & Plan (with Medical Decision Making)  79-year-old female who presented for evaluation from nursing home for some lower abdominal pain.  No acute findings on CT.  Some retained stool.   Digital rectal exam did disimpact a moderate amount of stool with some discomfort.  No large residual palpable stool burden.  UTI on urine.  Given IV Rocephin.  Will place on Cipro.  Appears stable for discharge back to nursing home at this point.     I have reviewed the nursing notes.    I have reviewed the findings, diagnosis, plan and need for follow up with the patient.          New Prescriptions    CIPROFLOXACIN (CIPRO) 250 MG TABLET    Take 1 tablet (250 mg) by mouth 2 times daily for 7 days.       Final diagnoses:   Abdominal pain, unspecified abdominal location       5/19/2025   [facility] EMERGENCY DEPT       Hunter Patel MD  05/19/25 8002

## 2025-05-20 NOTE — ED TRIAGE NOTES
PT comes in by EMS w/ abdominal pain and abdominal distension x3 days. She has a hx of diverticulitis. She is on hospice. She comes from memory care at [facility].

## 2025-05-21 LAB — BACTERIA UR CULT: ABNORMAL

## 2025-05-22 ENCOUNTER — TELEPHONE (OUTPATIENT)
Dept: NURSING | Facility: CLINIC | Age: 80
End: 2025-05-22
Payer: COMMERCIAL

## 2025-05-22 NOTE — TELEPHONE ENCOUNTER
[facility]    Reason for call: Lab Result Notification     Lab Result (including Rx patient on, if applicable).  If culture, copy of lab report at bottom.  Lab Result: Urine Culture - see below    ED Rx: ciprofloxacin (CIPRO) 250 MG tablet - Take 1 tablet (250 mg) by mouth 2 times daily for 7 days   >100,000 CFU/mL Escherichia coli Abnormal  (Susceptible)    Creatinine Level (mg/dl)   Creatinine   Date Value Ref Range Status   05/19/2025 0.46 (L) 0.51 - 0.95 mg/dL Final    Creatinine clearance (ml/min), if applicable    Creatinine clearance cannot be calculated (Unknown ideal weight.)     ED Symptoms: Patient presented to [facility] ED on 5/19/2025 via EMS from [facility] care at [facility] for evaluation of lower abdominal pain. Hx of recent femur repair.    RN Recommendations/Instructions per [place] ED lab result protocol:   [facility] ED lab result protocol utilized: Urine Culture  Continue antibiotic/medication as prescribed: Ciprofloxacin, pending patient assessment      Unable to reach patient/caregiver.     Left voicemail message for AUSTYN Titus at [facility] requesting a call back to [phone] between 9 a.m. and 5:30 p.m. for patient's ED/UC lab results.      Letter pended to be sent via [employer]PS mail. - Removed    ADDENDUM: Spoke with RNAnnamarie, at [facility] and reviewed urine culture results with her. Patient is tolerating Cipro and feeling better. Will fax report to [phone] per RN's request.        MARÍA Machado RN

## 2025-05-22 NOTE — LETTER
May 22, 2025        Moira Duarte  [id number] [address] [facility] [address]          Dear Moira Duarte:    You were seen in the [facility] Emergency Department at [facility] on 5/19/2025.  We are unable to reach you by phone, so we are sending you this letter.     It is important that you call [facility] Emergency Department lab result nurse at [phone], as we have information to relay to you AND/OR we MAY have to make some changes in your treatment.    Best time to call back is between 9AM and 5:30PM, 7 days a week.      Sincerely,     [facility] Emergency Department Lab Result RN  [phone]

## 2025-05-23 ENCOUNTER — LAB REQUISITION (OUTPATIENT)
Dept: LAB | Facility: CLINIC | Age: 80
End: 2025-05-23
Payer: COMMERCIAL

## 2025-05-23 DIAGNOSIS — I48.0 PAROXYSMAL ATRIAL FIBRILLATION (H): ICD-10-CM

## 2025-05-27 ENCOUNTER — APPOINTMENT (OUTPATIENT)
Dept: CT IMAGING | Facility: CLINIC | Age: 80
End: 2025-05-27
Attending: EMERGENCY MEDICINE
Payer: COMMERCIAL

## 2025-05-27 ENCOUNTER — HOSPITAL ENCOUNTER (EMERGENCY)
Facility: CLINIC | Age: 80
Discharge: NURSING, REHAB OR LONG-TERM CARE | End: 2025-05-27
Attending: EMERGENCY MEDICINE | Admitting: EMERGENCY MEDICINE
Payer: COMMERCIAL

## 2025-05-27 ENCOUNTER — APPOINTMENT (OUTPATIENT)
Dept: GENERAL RADIOLOGY | Facility: CLINIC | Age: 80
End: 2025-05-27
Attending: EMERGENCY MEDICINE
Payer: COMMERCIAL

## 2025-05-27 VITALS
RESPIRATION RATE: 24 BRPM | TEMPERATURE: 98.1 F | HEART RATE: 62 BPM | SYSTOLIC BLOOD PRESSURE: 156 MMHG | DIASTOLIC BLOOD PRESSURE: 59 MMHG | WEIGHT: 155.9 LBS | OXYGEN SATURATION: 99 % | HEART RATE: 62 BPM | TEMPERATURE: 98.1 F | DIASTOLIC BLOOD PRESSURE: 59 MMHG | SYSTOLIC BLOOD PRESSURE: 156 MMHG | RESPIRATION RATE: 24 BRPM | OXYGEN SATURATION: 99 % | WEIGHT: 155.9 LBS
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
## 2025-05-27 DIAGNOSIS — T14.90XA TRAUMA: ICD-10-CM
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
## 2025-05-27 PROCEDURE — 99284 EMERGENCY DEPT VISIT MOD MDM: CPT | Performed by: EMERGENCY MEDICINE

## 2025-05-27 PROCEDURE — 250N000013 HC RX MED GY IP 250 OP 250 PS 637: Performed by: EMERGENCY MEDICINE

## 2025-05-27 PROCEDURE — 72125 CT NECK SPINE W/O DYE: CPT

## 2025-05-27 PROCEDURE — 99284 EMERGENCY DEPT VISIT MOD MDM: CPT | Mod: 25 | Performed by: EMERGENCY MEDICINE

## 2025-05-27 PROCEDURE — 70450 CT HEAD/BRAIN W/O DYE: CPT

## 2025-05-27 PROCEDURE — 73552 X-RAY EXAM OF FEMUR 2/>: CPT | Mod: LT

## 2025-05-27 PROCEDURE — 72170 X-RAY EXAM OF PELVIS: CPT

## 2025-05-27 PROCEDURE — 74176 CT ABD & PELVIS W/O CONTRAST: CPT

## 2025-05-27 PROCEDURE — 71250 CT THORAX DX C-: CPT

## 2025-05-27 RX ORDER — HYDROMORPHONE HYDROCHLORIDE 1 MG/ML
0.5 SOLUTION ORAL ONCE
Status: COMPLETED | OUTPATIENT
Start: 2025-05-27 | End: 2025-05-27

## 2025-05-27 RX ADMIN — HYDROMORPHONE HYDROCHLORIDE 0.5 MG: 1 SOLUTION ORAL at 11:57

## 2025-05-27 ASSESSMENT — ACTIVITIES OF DAILY LIVING (ADL)
ADLS_ACUITY_SCORE: 41

## 2025-05-27 NOTE — ED NOTES
Patient arrived via EMS from [facility]. Patient is on Hospice through [facility] and nobody notified them. Call placed by writer to [facility] and spoke with Jessica the Triage nurse. She is paging out the Charge provider to call us. Nyasia Paul RN

## 2025-05-27 NOTE — ED TRIAGE NOTES
PT brought in by EMS coming from [facility] for concerns of left hip pain. PT fell today around 0725H while she was using the bathroom. Denies any other injuries. PT on blood thinners. PT with history of Dementia.         Yasmine Jansen accompanied Cheyanne Juarez to the emergency department on 6/12/2023  Return date if applicable: If you have any questions or concerns, please don't hesitate to call        Macie Díaz RN

## 2025-05-27 NOTE — ED NOTES
Vannesa- Nurse at [facility] contacted via phone and updated with results and plan for transport back but delay. Verbalizes understanding. Nyasia Paul RN

## 2025-05-27 NOTE — ED NOTES
Patient's son Gordon called with update on status and plan to transfer back to [facility] via EMS. He is agreeable to plan. Nyasia Paul RN

## 2025-05-27 NOTE — ED PROVIDER NOTES
History     Chief Complaint   Patient presents with    Fall    Hip Pain     HPI  Briseyda Archibald is a 79 year old female who presents for evaluation after a fall.  She has dementia.  She gets up sometimes unassisted.  She was found down in the bathroom.  She has a history of significant left femur repair.  She had some left hip and femur pain reported.  Also endorses some right chest wall pain.  Son here states they do not know the extent of how she fell exactly.    Allergies:  No Known Allergies    Problem List:    There are no active problems to display for this patient.       Past Medical History:    No past medical history on file.    Past Surgical History:    No past surgical history on file.    Family History:    No family history on file.    Social History:  Marital Status:   [5]        Medications:    No current outpatient medications on file.        Review of Systems   Unable to perform ROS: Dementia         Physical Exam   BP: (!) 156/59  Pulse: 62  Temp: 98.1  F (36.7  C)  Resp: 24  Weight: 70.7 kg (155 lb 14.4 oz) (Bed Weight.)  SpO2: 99 %      Physical Exam  Constitutional:       General: She is not in acute distress.     Appearance: She is not diaphoretic.   HENT:      Head: Atraumatic.   Eyes:      Pupils: Pupils are equal, round, and reactive to light.   Cardiovascular:      Rate and Rhythm: Regular rhythm.      Heart sounds: Normal heart sounds.   Pulmonary:      Effort: No respiratory distress.      Breath sounds: Normal breath sounds.   Chest:      Chest wall: Tenderness present.      Comments: Tenderness along the right anterior inferior chest wall.  No ecchymosis, swelling, flail chest segments or deformity noted.  Abdominal:      General: Bowel sounds are normal.      Palpations: Abdomen is soft.      Tenderness: There is no abdominal tenderness.   Musculoskeletal:         General: No tenderness. Normal range of motion.      Cervical back: No tenderness.      Thoracic back: No  tenderness.      Lumbar back: No tenderness.      Comments: Left upper leg reveals some tenderness to palpation.  No open areas, no dehiscence.  No erythema.  Incisions look clean and dry with good healing in process it appears.  Distal CMS intact to the feet.   Skin:     Findings: No abrasion or laceration.   Neurological:      Mental Status: She is alert and oriented to person, place, and time.         ED Course        Procedures                  Results for orders placed or performed during the hospital encounter of 05/27/25 (from the past 24 hours)   XR Femur Left 2 Views    Narrative    EXAM: XR FEMUR LEFT 2 VIEWS, XR PELVIS 1/2 VIEWS  LOCATION: [facility]  DATE: 5/27/2025    INDICATION: truma fall  COMPARISON: 05/11/2025      Impression    IMPRESSION: IM ross and screw fixation of the proximal left femur across a healed intertrochanteric femoral neck fracture. Lateral long plate and screw fixation across a comminuted periprosthetic fracture of the distal femur. Stable appearance and   alignment of the fracture without significant interval healing. No evidence of hardware failure.     Left total knee arthroplasty. No evidence of component loosening or definite new periprosthetic fracture.    No acute displaced pelvic fracture. Mild degenerative arthritis of both hips and additional degenerative changes in the lower lumbar spine, SI joints and pubic symphysis. Diffuse osseous demineralization.   XR Pelvis 1/2 Views    Narrative    EXAM: XR FEMUR LEFT 2 VIEWS, XR PELVIS 1/2 VIEWS  LOCATION: [facility]  DATE: 5/27/2025    INDICATION: truma fall  COMPARISON: 05/11/2025      Impression    IMPRESSION: IM ross and screw fixation of the proximal left femur across a healed intertrochanteric femoral neck fracture. Lateral long plate and screw fixation across a comminuted periprosthetic fracture of the distal femur. Stable appearance and   alignment of the  fracture without significant interval healing. No evidence of hardware failure.     Left total knee arthroplasty. No evidence of component loosening or definite new periprosthetic fracture.    No acute displaced pelvic fracture. Mild degenerative arthritis of both hips and additional degenerative changes in the lower lumbar spine, SI joints and pubic symphysis. Diffuse osseous demineralization.   Head CT w/o contrast    Narrative    EXAM: CT HEAD W/O CONTRAST  LOCATION: [facility]  DATE: 5/27/2025    INDICATION: trauma, pain  COMPARISON: CT head 4/14/2025.  TECHNIQUE: Routine CT Head without IV contrast. Multiplanar reformats. Dose reduction techniques were used.    FINDINGS:  INTRACRANIAL CONTENTS: No intracranial hemorrhage, extraaxial collection, or mass effect.  No CT evidence of acute infarct. Stable moderate size region of chronic infarction within the right middle cerebral artery anterior division territory and small   region of chronic cortical infarction within the left middle cerebral artery posterior division territory. Moderate to severe presumed chronic small vessel ischemic changes. Moderate generalized volume loss. No hydrocephalus.     VISUALIZED ORBITS/SINUSES/MASTOIDS: Prior bilateral cataract surgery. Visualized portions of the orbits are otherwise unremarkable. Mild right maxillary sinus polypoid mucosal thickening. No middle ear or mastoid effusion.    BONES/SOFT TISSUES: No acute abnormality.      Impression    IMPRESSION:  1.  No acute intracranial process. No significant change since 4/14/2025.  2.  Stable regions of chronic infarction within the right middle cerebral artery anterior division territory and left middle cerebral artery posterior division territory.  3.  Stable moderate to severe chronic small vessel ischemic disease and moderate generalized brain parenchymal volume loss.     CT Cervical Spine w/o Contrast    Narrative    EXAM: CT CERVICAL SPINE  W/O CONTRAST  LOCATION: [facility]  DATE: 5/27/2025    INDICATION: trauma. Fall.  COMPARISON: Cervical spine CT 03/30/2025.  TECHNIQUE: Routine CT Cervical Spine without IV contrast. Multiplanar reformats. Dose reduction techniques were used.    FINDINGS:  Images are degraded by motion.  VERTEBRA: Bones appear demineralized. Vertebral body heights are maintained and unchanged compared to the prior study. Normal spinal alignment. No fracture or posttraumatic subluxation.     CANAL/FORAMINA: There is mild to moderate degenerative disc disease and mild facet arthropathy throughout the cervical spine. No evidence of high-grade spinal canal or neural foraminal stenosis.    PARASPINAL: There is atherosclerotic plaque of the aortic arch, carotid arteries, and vertebral arteries.      Impression    IMPRESSION:  1.  Motion degraded study.  2.  No evidence of fracture or traumatic subluxation of the cervical spine.   CT Chest Abdomen Pelvis w/o Contrast    Narrative    EXAM: CT CHEST ABDOMEN PELVIS W/O CONTRAST  LOCATION: [facility]  DATE: 5/27/2025    INDICATION: Trauma.  COMPARISON: CT 4/17/2025.  TECHNIQUE: CT scan of the chest, abdomen, and pelvis was performed without IV contrast. Multiplanar reformats were obtained. Dose reduction techniques were used.   CONTRAST: None.    FINDINGS:   LUNGS AND PLEURA: No effusion or pneumothorax. Again noted are patchy bilateral groundglass opacities with smooth interstitial prominence. A region with groundglass opacities at the lingular area appears similar to prior; for instance series 4, image   134. Calcified granulomas at the left lower lobe base.    MEDIASTINUM/AXILLAE: Stable scattered mediastinal lymph nodes. Stable example at the precarinal location measuring short axis of 10 mm (series 3, image 61). No new adenopathy. Mild cardiomegaly again noted.    CORONARY ARTERY CALCIFICATION: Severe versus previous  intervention.    HEPATOBILIARY: Nodular contour of the liver. Cholelithiasis.    PANCREAS: No acute abnormality at unenhanced scanning. Pancreas head cystic lesion is stable measuring 12 mm (image 160).    SPLEEN: Normal.    ADRENAL GLANDS: Normal.    KIDNEYS/BLADDER: No hydronephrosis. Nonobstructing stone at the lower left kidney is 4 mm (series 3, image 184). No new renal abnormality. Bladder is unremarkable.    BOWEL: No obstruction. Normal appendix. Colonic diverticulosis. Moderate rectal distention with stool. Some rectal wall thickening is noted. No free fluid or free air. No abscess.    LYMPH NODES: Normal.    VASCULATURE: Diffuse vascular calcifications.    PELVIC ORGANS: No acute pelvic abnormality.    MUSCULOSKELETAL: Stable left femoral postoperative change. Stable subacute left inferior pubic ramus fracture with callus. Stable mild height loss at L1. Subacute healed left rib fractures appear stable. Sternotomy. No acute displaced fracture   identified. Diffuse degenerative changes throughout the spine.      Impression    IMPRESSION:  1.  No acute traumatic abnormality identified. Subacute fractures of the left inferior pubic ramus, left-sided ribs, and compression deformity at L1 appear stable.  2.  Patchy groundglass opacities in the bilateral lungs with mild cardiomegaly again noted. Correlate with pulmonary edema and CHF. Correlate with an atypical infectious etiology.  3.  Mild rectal distention with stool. A degree of rectal wall thickening is noted. Correlate clinically with any evidence of a proctitis.  4.  Colonic diverticulosis.  5.  Stable nonobstructing stone at the left kidney.       Medications - No data to display    Assessments & Plan (with Medical Decision Making)  79-year-old female with dementia who presents after a fall from her care facility in the bathroom.  This was unwitnessed.  Apparently she had initially complained of some left hip region pain where she has a known ORIF of her  femur and a known pubic rami fracture.  She also had complained of some right chest wall pain to me.  Extensive imaging undertaken including left femur which is negative for acute fracture.  CT of head, neck, chest, abdomen pelvis revealed no acute finding.  Pubic rami fracture seen.  Some atelectasis seen.  Some rectal distention with stool as seen previously.  She appears stable for discharge back to her care facility.  I did discuss her care with her physician Dr. Arreola, with the hospice team and updated them.  They do intend to up her bowel regimen to assist with things as well.  Follow-up for any concerns.     I have reviewed the nursing notes.    I have reviewed the findings, diagnosis, plan and need for follow up with the patient.          New Prescriptions    No medications on file       Final diagnoses:   Fall, initial encounter       5/27/2025   [facility] EMERGENCY DEPT       Lester Loyola MD  05/27/25 6546

## 2025-06-02 LAB
INR PPP: 1.82 (ref 0.85–1.15)
INR PPP: 1.82 (ref 0.85–1.15)
PROTHROMBIN TIME: 21.2 SECONDS (ref 11.8–14.8)
PROTHROMBIN TIME: 21.2 SECONDS (ref 11.8–14.8)

## 2025-06-02 PROCEDURE — 85610 PROTHROMBIN TIME: CPT | Mod: ORL | Performed by: FAMILY MEDICINE

## 2025-06-02 PROCEDURE — P9604 ONE-WAY ALLOW PRORATED TRIP: HCPCS | Mod: ORL | Performed by: FAMILY MEDICINE

## 2025-06-02 PROCEDURE — 36415 COLL VENOUS BLD VENIPUNCTURE: CPT | Mod: ORL | Performed by: FAMILY MEDICINE

## 2025-06-04 ENCOUNTER — LAB REQUISITION (OUTPATIENT)
Dept: LAB | Facility: CLINIC | Age: 80
End: 2025-06-04
Payer: COMMERCIAL

## 2025-06-04 DIAGNOSIS — I48.0 PAROXYSMAL ATRIAL FIBRILLATION (H): ICD-10-CM

## 2025-06-09 LAB
INR PPP: 1.51 (ref 0.85–1.15)
PROTHROMBIN TIME: 18.4 SECONDS (ref 11.8–14.8)

## 2025-06-09 PROCEDURE — 85610 PROTHROMBIN TIME: CPT | Mod: ORL | Performed by: FAMILY MEDICINE

## 2025-06-09 PROCEDURE — 36415 COLL VENOUS BLD VENIPUNCTURE: CPT | Mod: ORL | Performed by: FAMILY MEDICINE

## 2025-06-09 PROCEDURE — P9604 ONE-WAY ALLOW PRORATED TRIP: HCPCS | Mod: ORL | Performed by: FAMILY MEDICINE

## 2025-06-10 ENCOUNTER — LAB REQUISITION (OUTPATIENT)
Dept: LAB | Facility: CLINIC | Age: 80
End: 2025-06-10
Payer: COMMERCIAL

## 2025-06-10 DIAGNOSIS — I48.0 PAROXYSMAL ATRIAL FIBRILLATION (H): ICD-10-CM

## 2025-06-16 LAB
INR PPP: 1.99 (ref 0.85–1.15)
PROTHROMBIN TIME: 22.2 SECONDS (ref 11.8–14.8)

## 2025-06-16 PROCEDURE — P9603 ONE-WAY ALLOW PRORATED MILES: HCPCS | Mod: ORL | Performed by: FAMILY MEDICINE

## 2025-06-16 PROCEDURE — 85610 PROTHROMBIN TIME: CPT | Mod: ORL | Performed by: FAMILY MEDICINE

## 2025-06-16 PROCEDURE — 36415 COLL VENOUS BLD VENIPUNCTURE: CPT | Mod: ORL | Performed by: FAMILY MEDICINE

## 2025-06-17 ENCOUNTER — LAB REQUISITION (OUTPATIENT)
Dept: LAB | Facility: CLINIC | Age: 80
End: 2025-06-17
Payer: COMMERCIAL

## 2025-06-17 DIAGNOSIS — I48.0 PAROXYSMAL ATRIAL FIBRILLATION (H): ICD-10-CM

## 2025-06-23 LAB
INR PPP: 1.35 (ref 0.85–1.15)
PROTHROMBIN TIME: 16.6 SECONDS (ref 11.8–14.8)

## 2025-06-23 PROCEDURE — P9604 ONE-WAY ALLOW PRORATED TRIP: HCPCS | Mod: ORL | Performed by: FAMILY MEDICINE

## 2025-06-23 PROCEDURE — 85610 PROTHROMBIN TIME: CPT | Mod: ORL | Performed by: FAMILY MEDICINE

## 2025-06-23 PROCEDURE — 36415 COLL VENOUS BLD VENIPUNCTURE: CPT | Mod: ORL | Performed by: FAMILY MEDICINE

## 2025-06-24 ENCOUNTER — LAB REQUISITION (OUTPATIENT)
Dept: LAB | Facility: CLINIC | Age: 80
End: 2025-06-24
Payer: OTHER MISCELLANEOUS

## 2025-06-24 DIAGNOSIS — E11.9 TYPE 2 DIABETES MELLITUS WITHOUT COMPLICATIONS (H): ICD-10-CM

## 2025-06-30 LAB
EST. AVERAGE GLUCOSE BLD GHB EST-MCNC: 151 MG/DL
HBA1C MFR BLD: 6.9 %

## 2025-06-30 PROCEDURE — P9603 ONE-WAY ALLOW PRORATED MILES: HCPCS | Mod: ORL | Performed by: NURSE PRACTITIONER

## 2025-06-30 PROCEDURE — 83036 HEMOGLOBIN GLYCOSYLATED A1C: CPT | Mod: ORL | Performed by: NURSE PRACTITIONER

## 2025-06-30 PROCEDURE — 36415 COLL VENOUS BLD VENIPUNCTURE: CPT | Mod: ORL | Performed by: NURSE PRACTITIONER

## 2025-07-02 ENCOUNTER — MEDICAL CORRESPONDENCE (OUTPATIENT)
Dept: HEALTH INFORMATION MANAGEMENT | Facility: CLINIC | Age: 80
End: 2025-07-02
Payer: COMMERCIAL

## 2025-07-12 ENCOUNTER — APPOINTMENT (OUTPATIENT)
Dept: CT IMAGING | Facility: CLINIC | Age: 80
End: 2025-07-12
Attending: STUDENT IN AN ORGANIZED HEALTH CARE EDUCATION/TRAINING PROGRAM
Payer: COMMERCIAL

## 2025-07-12 ENCOUNTER — HOSPITAL ENCOUNTER (OUTPATIENT)
Facility: CLINIC | Age: 80
Setting detail: OBSERVATION
Discharge: NURSING, REHAB OR LONG-TERM CARE | End: 2025-07-14
Attending: STUDENT IN AN ORGANIZED HEALTH CARE EDUCATION/TRAINING PROGRAM | Admitting: INTERNAL MEDICINE
Payer: COMMERCIAL

## 2025-07-12 DIAGNOSIS — Z51.5 HOSPICE CARE PATIENT: ICD-10-CM

## 2025-07-12 DIAGNOSIS — S06.5XAA SUBDURAL HEMATOMA (H): ICD-10-CM

## 2025-07-12 DIAGNOSIS — S06.5XAA ACUTE SUBDURAL HEMATOMA (H): Primary | ICD-10-CM

## 2025-07-12 LAB
ALBUMIN SERPL BCG-MCNC: 3.4 G/DL (ref 3.5–5.2)
ALP SERPL-CCNC: 214 U/L (ref 40–150)
ALT SERPL W P-5'-P-CCNC: 12 U/L (ref 0–50)
ANION GAP SERPL CALCULATED.3IONS-SCNC: 13 MMOL/L (ref 7–15)
AST SERPL W P-5'-P-CCNC: 24 U/L (ref 0–45)
BASOPHILS # BLD AUTO: 0 10E3/UL (ref 0–0.2)
BASOPHILS NFR BLD AUTO: 0 %
BILIRUB SERPL-MCNC: 0.5 MG/DL
BUN SERPL-MCNC: 8.5 MG/DL (ref 8–23)
CALCIUM SERPL-MCNC: 10.1 MG/DL (ref 8.8–10.4)
CHLORIDE SERPL-SCNC: 100 MMOL/L (ref 98–107)
CREAT SERPL-MCNC: 0.57 MG/DL (ref 0.51–0.95)
EGFRCR SERPLBLD CKD-EPI 2021: >90 ML/MIN/1.73M2
EOSINOPHIL # BLD AUTO: 0.2 10E3/UL (ref 0–0.7)
EOSINOPHIL NFR BLD AUTO: 2 %
ERYTHROCYTE [DISTWIDTH] IN BLOOD BY AUTOMATED COUNT: 12.1 % (ref 10–15)
GLUCOSE SERPL-MCNC: 187 MG/DL (ref 70–99)
HCO3 SERPL-SCNC: 26 MMOL/L (ref 22–29)
HCT VFR BLD AUTO: 42.9 % (ref 35–47)
HGB BLD-MCNC: 14.5 G/DL (ref 11.7–15.7)
IMM GRANULOCYTES # BLD: 0.1 10E3/UL
IMM GRANULOCYTES NFR BLD: 1 %
INR PPP: 1.09 (ref 0.85–1.15)
LYMPHOCYTES # BLD AUTO: 0.7 10E3/UL (ref 0.8–5.3)
LYMPHOCYTES NFR BLD AUTO: 9 %
MCH RBC QN AUTO: 34.9 PG (ref 26.5–33)
MCHC RBC AUTO-ENTMCNC: 33.8 G/DL (ref 31.5–36.5)
MCV RBC AUTO: 103 FL (ref 78–100)
MONOCYTES # BLD AUTO: 0.6 10E3/UL (ref 0–1.3)
MONOCYTES NFR BLD AUTO: 7 %
NEUTROPHILS # BLD AUTO: 6.8 10E3/UL (ref 1.6–8.3)
NEUTROPHILS NFR BLD AUTO: 82 %
NRBC # BLD AUTO: 0 10E3/UL
NRBC BLD AUTO-RTO: 0 /100
PLATELET # BLD AUTO: 136 10E3/UL (ref 150–450)
POTASSIUM SERPL-SCNC: 3.8 MMOL/L (ref 3.4–5.3)
PROT SERPL-MCNC: 7.4 G/DL (ref 6.4–8.3)
PROTHROMBIN TIME: 14 SECONDS (ref 11.8–14.8)
RAD FLAG-ADDENDUM: ABNORMAL
RAD FLAG-ADDENDUM: ABNORMAL
RBC # BLD AUTO: 4.16 10E6/UL (ref 3.8–5.2)
SODIUM SERPL-SCNC: 139 MMOL/L (ref 135–145)
WBC # BLD AUTO: 8.3 10E3/UL (ref 4–11)

## 2025-07-12 PROCEDURE — 71250 CT THORAX DX C-: CPT

## 2025-07-12 PROCEDURE — 72125 CT NECK SPINE W/O DYE: CPT

## 2025-07-12 PROCEDURE — 85610 PROTHROMBIN TIME: CPT | Performed by: STUDENT IN AN ORGANIZED HEALTH CARE EDUCATION/TRAINING PROGRAM

## 2025-07-12 PROCEDURE — G0378 HOSPITAL OBSERVATION PER HR: HCPCS

## 2025-07-12 PROCEDURE — 85004 AUTOMATED DIFF WBC COUNT: CPT | Performed by: STUDENT IN AN ORGANIZED HEALTH CARE EDUCATION/TRAINING PROGRAM

## 2025-07-12 PROCEDURE — 999N000104 CT LUMBAR SPINE RECONSTRUCTED

## 2025-07-12 PROCEDURE — 82947 ASSAY GLUCOSE BLOOD QUANT: CPT | Performed by: STUDENT IN AN ORGANIZED HEALTH CARE EDUCATION/TRAINING PROGRAM

## 2025-07-12 PROCEDURE — 70450 CT HEAD/BRAIN W/O DYE: CPT

## 2025-07-12 PROCEDURE — 250N000013 HC RX MED GY IP 250 OP 250 PS 637: Performed by: INTERNAL MEDICINE

## 2025-07-12 PROCEDURE — 250N000013 HC RX MED GY IP 250 OP 250 PS 637: Performed by: HOSPITALIST

## 2025-07-12 PROCEDURE — 99285 EMERGENCY DEPT VISIT HI MDM: CPT | Mod: 25

## 2025-07-12 PROCEDURE — 99285 EMERGENCY DEPT VISIT HI MDM: CPT | Performed by: STUDENT IN AN ORGANIZED HEALTH CARE EDUCATION/TRAINING PROGRAM

## 2025-07-12 PROCEDURE — 36415 COLL VENOUS BLD VENIPUNCTURE: CPT | Performed by: STUDENT IN AN ORGANIZED HEALTH CARE EDUCATION/TRAINING PROGRAM

## 2025-07-12 PROCEDURE — 999N000104 CT THORACIC SPINE RECONSTRUCTED

## 2025-07-12 PROCEDURE — 99223 1ST HOSP IP/OBS HIGH 75: CPT | Performed by: INTERNAL MEDICINE

## 2025-07-12 RX ORDER — EMPAGLIFLOZIN 10 MG/1
10 TABLET, FILM COATED ORAL DAILY
Status: ON HOLD | COMMUNITY
Start: 2025-07-01 | End: 2025-07-14

## 2025-07-12 RX ORDER — AMOXICILLIN 250 MG
1 CAPSULE ORAL 2 TIMES DAILY PRN
Status: DISCONTINUED | OUTPATIENT
Start: 2025-07-12 | End: 2025-07-14 | Stop reason: HOSPADM

## 2025-07-12 RX ORDER — AMOXICILLIN 250 MG
2 CAPSULE ORAL 2 TIMES DAILY PRN
Status: DISCONTINUED | OUTPATIENT
Start: 2025-07-12 | End: 2025-07-14 | Stop reason: HOSPADM

## 2025-07-12 RX ORDER — FUROSEMIDE 20 MG/1
20 TABLET ORAL DAILY
Status: DISCONTINUED | OUTPATIENT
Start: 2025-07-13 | End: 2025-07-14 | Stop reason: HOSPADM

## 2025-07-12 RX ORDER — METOPROLOL SUCCINATE 25 MG/1
25 TABLET, EXTENDED RELEASE ORAL DAILY
Status: DISCONTINUED | OUTPATIENT
Start: 2025-07-13 | End: 2025-07-14 | Stop reason: HOSPADM

## 2025-07-12 RX ORDER — PROCHLORPERAZINE MALEATE 5 MG/1
5 TABLET ORAL EVERY 6 HOURS PRN
Status: DISCONTINUED | OUTPATIENT
Start: 2025-07-12 | End: 2025-07-14 | Stop reason: HOSPADM

## 2025-07-12 RX ORDER — NALOXONE HYDROCHLORIDE 0.4 MG/ML
0.4 INJECTION, SOLUTION INTRAMUSCULAR; INTRAVENOUS; SUBCUTANEOUS
Status: DISCONTINUED | OUTPATIENT
Start: 2025-07-12 | End: 2025-07-13

## 2025-07-12 RX ORDER — INSULIN ASPART 100 [IU]/ML
6 INJECTION, SOLUTION INTRAVENOUS; SUBCUTANEOUS
Status: ON HOLD | COMMUNITY
Start: 2024-05-22 | End: 2025-07-14

## 2025-07-12 RX ORDER — ONDANSETRON 2 MG/ML
4 INJECTION INTRAMUSCULAR; INTRAVENOUS EVERY 6 HOURS PRN
Status: DISCONTINUED | OUTPATIENT
Start: 2025-07-12 | End: 2025-07-14 | Stop reason: HOSPADM

## 2025-07-12 RX ORDER — LORAZEPAM 0.5 MG/1
0.5 TABLET ORAL DAILY
COMMUNITY
Start: 2025-06-20

## 2025-07-12 RX ORDER — LORAZEPAM 0.5 MG/1
0.5 TABLET ORAL EVERY 4 HOURS PRN
Status: DISCONTINUED | OUTPATIENT
Start: 2025-07-12 | End: 2025-07-14 | Stop reason: HOSPADM

## 2025-07-12 RX ORDER — SENNOSIDES 8.6 MG/1
8.6 TABLET ORAL 2 TIMES DAILY
COMMUNITY
Start: 2025-02-26

## 2025-07-12 RX ORDER — NALOXONE HYDROCHLORIDE 0.4 MG/ML
0.2 INJECTION, SOLUTION INTRAMUSCULAR; INTRAVENOUS; SUBCUTANEOUS
Status: DISCONTINUED | OUTPATIENT
Start: 2025-07-12 | End: 2025-07-13

## 2025-07-12 RX ORDER — HYDROMORPHONE HYDROCHLORIDE 1 MG/ML
0.5 SOLUTION ORAL EVERY 4 HOURS PRN
Status: COMPLETED | OUTPATIENT
Start: 2025-07-12 | End: 2025-07-13

## 2025-07-12 RX ORDER — ACETAMINOPHEN 500 MG
1000 TABLET ORAL 3 TIMES DAILY
COMMUNITY
Start: 2025-05-28

## 2025-07-12 RX ORDER — HYDROMORPHONE HYDROCHLORIDE 1 MG/ML
0.5 INJECTION, SOLUTION INTRAMUSCULAR; INTRAVENOUS; SUBCUTANEOUS EVERY 4 HOURS PRN
Status: DISCONTINUED | OUTPATIENT
Start: 2025-07-12 | End: 2025-07-12

## 2025-07-12 RX ORDER — ACETAMINOPHEN 325 MG/1
650 TABLET ORAL EVERY 4 HOURS PRN
Status: DISCONTINUED | OUTPATIENT
Start: 2025-07-12 | End: 2025-07-14 | Stop reason: HOSPADM

## 2025-07-12 RX ORDER — HYDROMORPHONE HCL IN WATER/PF 6 MG/30 ML
0.2 PATIENT CONTROLLED ANALGESIA SYRINGE INTRAVENOUS
Status: DISCONTINUED | OUTPATIENT
Start: 2025-07-12 | End: 2025-07-12

## 2025-07-12 RX ORDER — OLANZAPINE 2.5 MG/1
2.5 TABLET, FILM COATED ORAL AT BEDTIME
COMMUNITY
Start: 2025-05-14

## 2025-07-12 RX ORDER — FUROSEMIDE 20 MG/1
20 TABLET ORAL DAILY
COMMUNITY
Start: 2024-12-27

## 2025-07-12 RX ORDER — DOCUSATE SODIUM 100 MG/1
100 CAPSULE, LIQUID FILLED ORAL 2 TIMES DAILY
Status: DISCONTINUED | OUTPATIENT
Start: 2025-07-12 | End: 2025-07-14 | Stop reason: HOSPADM

## 2025-07-12 RX ORDER — ONDANSETRON 4 MG/1
4 TABLET, ORALLY DISINTEGRATING ORAL EVERY 6 HOURS PRN
Status: DISCONTINUED | OUTPATIENT
Start: 2025-07-12 | End: 2025-07-14 | Stop reason: HOSPADM

## 2025-07-12 RX ORDER — ACETAMINOPHEN 650 MG/1
650 SUPPOSITORY RECTAL EVERY 4 HOURS PRN
Status: DISCONTINUED | OUTPATIENT
Start: 2025-07-12 | End: 2025-07-14 | Stop reason: HOSPADM

## 2025-07-12 RX ORDER — METOPROLOL SUCCINATE 25 MG/1
25 TABLET, EXTENDED RELEASE ORAL DAILY
COMMUNITY
Start: 2024-08-20

## 2025-07-12 RX ADMIN — HYDROMORPHONE HYDROCHLORIDE 0.5 MG: 1 SOLUTION ORAL at 21:39

## 2025-07-12 RX ADMIN — DOCUSATE SODIUM 100 MG: 100 CAPSULE, LIQUID FILLED ORAL at 21:39

## 2025-07-12 RX ADMIN — LORAZEPAM 0.5 MG: 0.5 TABLET ORAL at 21:39

## 2025-07-12 ASSESSMENT — COLUMBIA-SUICIDE SEVERITY RATING SCALE - C-SSRS
2. HAVE YOU ACTUALLY HAD ANY THOUGHTS OF KILLING YOURSELF IN THE PAST MONTH?: NO
1. IN THE PAST MONTH, HAVE YOU WISHED YOU WERE DEAD OR WISHED YOU COULD GO TO SLEEP AND NOT WAKE UP?: NO
6. HAVE YOU EVER DONE ANYTHING, STARTED TO DO ANYTHING, OR PREPARED TO DO ANYTHING TO END YOUR LIFE?: NO

## 2025-07-12 ASSESSMENT — ACTIVITIES OF DAILY LIVING (ADL)
ADLS_ACUITY_SCORE: 41
ADLS_ACUITY_SCORE: 41
ADLS_ACUITY_SCORE: 40
ADLS_ACUITY_SCORE: 41

## 2025-07-12 NOTE — ED NOTES
Patient's assisted living facility nurse triage line reports that the patient is no longer taking warfarin after last hospitalization.

## 2025-07-12 NOTE — ED PROVIDER NOTES
"  History     Chief Complaint   Patient presents with    Fall     HPI  Moira Duarte is a 79 year old female who presents to the emergency department after 2 falls at a local [facility].  She does have known frequent falls.  She is currently enrolled in hospice.  Typically she is picked up from the ground and helped up into a chair bed however this time she was complaining of back pain.  No medications given en route.  She was reported to be at her baseline mental status per the memory care unit.  She was not bleeding from anywhere.  She has scattered bruises however these were noted previous and the nursing facility did not note any obvious new bruising.    Allergies:  Allergies   Allergen Reactions    Atorvastatin Other (See Comments)     ALLERGIC REACTION:  MUSCLE AND BODY ACHES   (lipitor)    Morphine Itching    Metformin Palpitations       Problem List:    Patient Active Problem List    Diagnosis Date Noted    Acute subdural hematoma (H) 07/12/2025     Priority: Medium    Subdural hematoma (H) 07/12/2025     Priority: Medium        Past Medical History:    No past medical history on file.    Past Surgical History:    No past surgical history on file.    Family History:    No family history on file.    Social History:  Marital Status:   [5]        Medications:    No current outpatient medications on file.        Review of Systems  Difficult to obtain due to patient's chronic mental status    Physical Exam   BP: (!) 182/122  Pulse: 69  Temp: 97.8  F (36.6  C)  Resp: 20  Height: 170.2 cm (5' 7\")  Weight: 73.9 kg (163 lb)  SpO2: 99 %      Physical Exam  Gen: Awake, alert, no distress  Head: No gross lacerations, no appreciated irregularities of the skull  Face: No tenderness, no gross lesions, scattered bruising  Eyes: Conjunctiva clear,Pupils are equal round and reactive bilaterally  Ears: No gross evidence of external trauma, no gross blood, no hemotympanum bilaterally, negative Loya sign " bilaterally  Nose: Grossly midline, no bleeding, no septal hematoma, no CSF leak  Mouth: No appreciated lacerations or bruising, no tender or missing/fractured teeth, posterior pharynx open and clear  Neck: Trachea grossly midline, no bruising, no subcutaneous emphysema, no tenderness, no cervical spine tenderness, no c-collar  Chest: No gross bruising, symmetrical chest wall movement, no gross lesions, no tenderness, S1 and S2 cardiac sounds appreciated, lungs clear bilaterally, no respiratory distress  Abdomen: No gross lesions, no tenderness, no ecchymosis, no distention  Pelvis: Stable without tenderness  Right upper extremity: No joint or bony tenderness, painless range of motion, intact neurovascular exam, no skin lesions  Left upper extremity: No joint or bony tenderness, painless range of motion, intact neurovascular exam, no skin lesions  Right lower extremity: No joint or bony tenderness, painless range of motion, intact neurovascular exam, no skin lesions  Left lower extremity: No joint or bony tenderness, painless range of motion, intact neurovascular exam, no skin lesions  Back: no midline tenderness, no skin lesions       ED Course        Procedures                  Recent Results (from the past 24 hours)   Head CT w/o contrast   Result Value Ref Range    Rad Flag-Addendum Acute intracranial hemorrhage (AA)     Addendum: 7/12/2025    ADDENDUM:    [Critical Result: Acute intracranial hemorrhage]    Finding was identified on 7/12/2025 5:25 PM CDT.     Dr. Tompkins was contacted by me on 7/12/2025 5:32 PM CDT and verbalized understanding of the critical result.     END ADDENDUM      Narrative    EXAM: CT HEAD W/O CONTRAST, CT CERVICAL SPINE W/O CONTRAST  LOCATION: [facility]  DATE: 7/12/2025    INDICATION: fall, dementia  COMPARISON: CT cervical spine dated 05/27/2025 and head CT dated 06/27/2025  TECHNIQUE:   1) Routine CT Head without IV contrast. Multiplanar reformats.  Dose reduction techniques were used.  2) Routine CT Cervical Spine without IV contrast. Multiplanar reformats. Dose reduction techniques were used.    FINDINGS:   HEAD CT:   INTRACRANIAL CONTENTS: New acute right-sided subdural hematoma measures up to 13 mm in diameter. Associated mass effect with flattening of the underlying sulci and 4 mm of leftward midline shift. New acute left-sided subdural hematoma measures up to 6 mm   in diameter. Evolving focal subacute subarachnoid hemorrhage along the paramedian left frontal lobe has has increased in volume but decreased in attenuation since the prior study. Decreasing trace subarachnoid hemorrhage along the anterior left frontal   lobe. No parenchymal hemorrhage. No CT evidence of acute infarct. Moderate to severe presumed chronic small vessel ischemic changes. 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 No hydrocephalus. Basilar cisterns are patent. Moderate to marked intracranial   atherosclerotic disease.    VISUALIZED ORBITS/SINUSES/MASTOIDS: Prior bilateral cataract surgery. Visualized portions of the orbits are otherwise unremarkable. Small mucous retention cyst right maxillary sinus and tiny mucous retention cyst left maxillary sinus. No middle ear or   mastoid effusion.    BONES/SOFT TISSUES: Focal scalp hematoma in the left frontal region. No skull fracture.    CERVICAL SPINE CT:   VERTEBRA: Slight degenerative anterolisthesis of C3 on C4. Otherwise normal vertebral alignment. Normal vertebral body heights. Mild degenerative changes between the anterior arch of C1 and the dens. Moderate degenerative disc disease from C5 to C7.   Osteopenia. No fracture or posttraumatic subluxation.     CANAL/FORAMINA: No central canal stenosis. Moderate left C3-C4, moderate right and mild left C5-C6, and moderate left and mild right C6-C7 foraminal narrowing due to facet hypertrophic changes and uncovertebral osteophytes.    PARASPINAL: Prominent calcified atherosclerotic plaque at  the right subclavian artery origin and at both carotid bifurcations. Visualized lung fields are clear.      Impression    IMPRESSION:  HEAD CT:  1.  New acute bilateral subdural hematomas, right greater than left.  2.  Associated mass effect with 4 mm of leftward midline shift.  3.  Evolving focal subacute subarachnoid hemorrhage along the paramedian left frontal lobe has increased in volume but decreased in attenuation.  4.  Decreasing trace subarachnoid hemorrhage along the anterior left frontal lobe.  5.  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    CERVICAL SPINE CT:  1.  No CT evidence for acute fracture or post traumatic subluxation.  2.  Degenerative changes as described above.     CT Cervical Spine w/o Contrast   Result Value Ref Range    Rad Flag-Addendum Acute intracranial hemorrhage (AA)     Addendum: 7/12/2025    ADDENDUM:    [Critical Result: Acute intracranial hemorrhage]    Finding was identified on 7/12/2025 5:25 PM CDT.     Dr. Tompkins was contacted by me on 7/12/2025 5:32 PM CDT and verbalized understanding of the critical result.     END ADDENDUM      Narrative    EXAM: CT HEAD W/O CONTRAST, CT CERVICAL SPINE W/O CONTRAST  LOCATION: [facility]  DATE: 7/12/2025    INDICATION: fall, dementia  COMPARISON: CT cervical spine dated 05/27/2025 and head CT dated 06/27/2025  TECHNIQUE:   1) Routine CT Head without IV contrast. Multiplanar reformats. Dose reduction techniques were used.  2) Routine CT Cervical Spine without IV contrast. Multiplanar reformats. Dose reduction techniques were used.    FINDINGS:   HEAD CT:   INTRACRANIAL CONTENTS: New acute right-sided subdural hematoma measures up to 13 mm in diameter. Associated mass effect with flattening of the underlying sulci and 4 mm of leftward midline shift. New acute left-sided subdural hematoma measures up to 6 mm   in diameter. Evolving focal subacute  subarachnoid hemorrhage along the paramedian left frontal lobe has has increased in volume but decreased in attenuation since the prior study. Decreasing trace subarachnoid hemorrhage along the anterior left frontal   lobe. No parenchymal hemorrhage. No CT evidence of acute infarct. Moderate to severe presumed chronic small vessel ischemic changes. 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 No hydrocephalus. Basilar cisterns are patent. Moderate to marked intracranial   atherosclerotic disease.    VISUALIZED ORBITS/SINUSES/MASTOIDS: Prior bilateral cataract surgery. Visualized portions of the orbits are otherwise unremarkable. Small mucous retention cyst right maxillary sinus and tiny mucous retention cyst left maxillary sinus. No middle ear or   mastoid effusion.    BONES/SOFT TISSUES: Focal scalp hematoma in the left frontal region. No skull fracture.    CERVICAL SPINE CT:   VERTEBRA: Slight degenerative anterolisthesis of C3 on C4. Otherwise normal vertebral alignment. Normal vertebral body heights. Mild degenerative changes between the anterior arch of C1 and the dens. Moderate degenerative disc disease from C5 to C7.   Osteopenia. No fracture or posttraumatic subluxation.     CANAL/FORAMINA: No central canal stenosis. Moderate left C3-C4, moderate right and mild left C5-C6, and moderate left and mild right C6-C7 foraminal narrowing due to facet hypertrophic changes and uncovertebral osteophytes.    PARASPINAL: Prominent calcified atherosclerotic plaque at the right subclavian artery origin and at both carotid bifurcations. Visualized lung fields are clear.      Impression    IMPRESSION:  HEAD CT:  1.  New acute bilateral subdural hematomas, right greater than left.  2.  Associated mass effect with 4 mm of leftward midline shift.  3.  Evolving focal subacute subarachnoid hemorrhage along the paramedian left frontal lobe has increased in volume but decreased in attenuation.  4.  Decreasing trace subarachnoid  hemorrhage along the anterior left frontal lobe.  5.  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    CERVICAL SPINE CT:  1.  No CT evidence for acute fracture or post traumatic subluxation.  2.  Degenerative changes as described above.     CT Thoracic Spine Reconstructed    Narrative    EXAM: CT LUMBAR SPINE RECONSTRUCTED, CT THORACIC SPINE RECONSTRUCTED  LOCATION: [facility]  DATE: 07/12/2025    INDICATION: Fall. Dementia.  COMPARISON: CT abdomen and pelvis dated 06/27/2025.  TECHNIQUE:  1) Routine CT Thoracic Spine without IV contrast. Multiplanar reformats. Dose reduction techniques were used.   2) Routine CT Lumbar Spine without IV contrast. Multiplanar reformats. Dose reduction techniques were used.     FINDINGS: Patient motion markedly degrades evaluation. Within these confines:      THORACIC SPINE CT:  VERTEBRA: Decreased osseous mineralization. Multilevel flowing anterolateral osteophytes/syndesmophytes noted throughout the thoracic spine. Thoracic spine vertebral body heights are maintained. There is minimal dextroconvex curvature of the thoracic   spine. No definite acute fracture or posttraumatic subluxation.    CANAL/FORAMINA: No high-grade canal or neural foraminal stenosis.    PARASPINAL: No extraspinal abnormality.      LUMBAR SPINE CT:  VERTEBRA: Decreased osseous mineralization. Stable vertebral body heights. At L4-L5 there is stable grade 1 anterolisthesis measuring approximately 3-4 mm. There is mild levoconvex curvature of the lumbar spine. No definite acute fracture or   posttraumatic subluxation.     CANAL/FORAMINA: Advanced multilevel degenerative changes of the lumbar spine. At L2-L3 there is mild canal stenosis. At L3-L4 there is severe canal stenosis. At L4-L5 there is mild canal stenosis. At L5-S1 there is no high-grade canal stenosis, however,   there is a disc osteophyte complex, eccentric to the  left lateral recess causing severe left lateral recess stenosis with apparent contact of the descending left S1 cauda equina nerve root. On the left at L3-L4 there is severe neural foraminal stenosis.   On the left at L4-L5 there is severe neural foraminal stenosis. On the left at L5-S1 there is moderate neural foraminal stenosis. On the right at L3-L4 there is severe neural foraminal stenosis. On the right at L4-L5 there is severe neural foraminal   stenosis. On the right at L5-S1 there is moderate to severe neural foraminal stenosis.    PARASPINAL: No extraspinal abnormality.        Impression    IMPRESSION:  THORACIC SPINE CT:  1.  No definite acute fracture or posttraumatic subluxation.  2.  No high-grade spinal canal or neural foraminal stenosis.    LUMBAR SPINE CT:  1.  No definite acute fracture or posttraumatic subluxation.  2.  Advanced multilevel degenerative changes of the lumbar spine, as above.   CT Lumbar Spine Reconstructed    Narrative    EXAM: CT LUMBAR SPINE RECONSTRUCTED, CT THORACIC SPINE RECONSTRUCTED  LOCATION: [facility]  DATE: 07/12/2025    INDICATION: Fall. Dementia.  COMPARISON: CT abdomen and pelvis dated 06/27/2025.  TECHNIQUE:  1) Routine CT Thoracic Spine without IV contrast. Multiplanar reformats. Dose reduction techniques were used.   2) Routine CT Lumbar Spine without IV contrast. Multiplanar reformats. Dose reduction techniques were used.     FINDINGS: Patient motion markedly degrades evaluation. Within these confines:      THORACIC SPINE CT:  VERTEBRA: Decreased osseous mineralization. Multilevel flowing anterolateral osteophytes/syndesmophytes noted throughout the thoracic spine. Thoracic spine vertebral body heights are maintained. There is minimal dextroconvex curvature of the thoracic   spine. No definite acute fracture or posttraumatic subluxation.    CANAL/FORAMINA: No high-grade canal or neural foraminal stenosis.    PARASPINAL: No extraspinal  abnormality.      LUMBAR SPINE CT:  VERTEBRA: Decreased osseous mineralization. Stable vertebral body heights. At L4-L5 there is stable grade 1 anterolisthesis measuring approximately 3-4 mm. There is mild levoconvex curvature of the lumbar spine. No definite acute fracture or   posttraumatic subluxation.     CANAL/FORAMINA: Advanced multilevel degenerative changes of the lumbar spine. At L2-L3 there is mild canal stenosis. At L3-L4 there is severe canal stenosis. At L4-L5 there is mild canal stenosis. At L5-S1 there is no high-grade canal stenosis, however,   there is a disc osteophyte complex, eccentric to the left lateral recess causing severe left lateral recess stenosis with apparent contact of the descending left S1 cauda equina nerve root. On the left at L3-L4 there is severe neural foraminal stenosis.   On the left at L4-L5 there is severe neural foraminal stenosis. On the left at L5-S1 there is moderate neural foraminal stenosis. On the right at L3-L4 there is severe neural foraminal stenosis. On the right at L4-L5 there is severe neural foraminal   stenosis. On the right at L5-S1 there is moderate to severe neural foraminal stenosis.    PARASPINAL: No extraspinal abnormality.        Impression    IMPRESSION:  THORACIC SPINE CT:  1.  No definite acute fracture or posttraumatic subluxation.  2.  No high-grade spinal canal or neural foraminal stenosis.    LUMBAR SPINE CT:  1.  No definite acute fracture or posttraumatic subluxation.  2.  Advanced multilevel degenerative changes of the lumbar spine, as above.   CT Chest Abdomen Pelvis w/o Contrast    Narrative    EXAM: CT CHEST ABDOMEN PELVIS W/O CONTRAST  LOCATION: [facility]  DATE: 07/12/2025    INDICATION: Fall, dementia.  COMPARISON: None.  TECHNIQUE: CT scan of the chest, abdomen, and pelvis was performed without IV contrast. Multiplanar reformats were obtained. Dose reduction techniques were used.   CONTRAST:  None.    FINDINGS:   LUNGS AND PLEURA: No pneumothorax or focal infiltrate.    MEDIASTINUM/AXILLAE: Normal.    CORONARY ARTERY CALCIFICATION: Previous intervention (stents or CABG).    HEPATOBILIARY: Cirrhotic configuration of the liver. Cholelithiasis.    PANCREAS: Normal.    SPLEEN: Normal.    ADRENAL GLANDS: Normal.    KIDNEYS/BLADDER: 2 mm stone lower pole left kidney. No other stones.    BOWEL: Colonic diverticulosis without evidence for diverticulitis.    LYMPH NODES: Normal.    VASCULATURE: Atherosclerotic plaque throughout the aorta.    PELVIC ORGANS: Normal.    MUSCULOSKELETAL: Degenerative change in the spine. No fractures identified. Calcification of the interspinous ligament thoracic and upper lumbar spine.      Impression    IMPRESSION:  1.  No evidence for acute traumatic injury in the chest, abdomen, or pelvis.   CBC with platelets differential    Narrative    The following orders were created for panel order CBC with platelets differential.  Procedure                               Abnormality         Status                     ---------                               -----------         ------                     CBC with platelets and ...[[id number]]  Abnormal            Final result                 Please view results for these tests on the individual orders.   Comprehensive metabolic panel   Result Value Ref Range    Sodium 139 135 - 145 mmol/L    Potassium 3.8 3.4 - 5.3 mmol/L    Carbon Dioxide (CO2) 26 22 - 29 mmol/L    Anion Gap 13 7 - 15 mmol/L    Urea Nitrogen 8.5 8.0 - 23.0 mg/dL    Creatinine 0.57 0.51 - 0.95 mg/dL    GFR Estimate >90 >60 mL/min/1.73m2    Calcium 10.1 8.8 - 10.4 mg/dL    Chloride 100 98 - 107 mmol/L    Glucose 187 (H) 70 - 99 mg/dL    Alkaline Phosphatase 214 (H) 40 - 150 U/L    AST 24 0 - 45 U/L    ALT 12 0 - 50 U/L    Protein Total 7.4 6.4 - 8.3 g/dL    Albumin 3.4 (L) 3.5 - 5.2 g/dL    Bilirubin Total 0.5 <=1.2 mg/dL   INR   Result Value Ref Range    INR 1.09 0.85 - 1.15     PT 14.0 11.8 - 14.8 Seconds   CBC with platelets and differential   Result Value Ref Range    WBC Count 8.3 4.0 - 11.0 10e3/uL    RBC Count 4.16 3.80 - 5.20 10e6/uL    Hemoglobin 14.5 11.7 - 15.7 g/dL    Hematocrit 42.9 35.0 - 47.0 %     (H) 78 - 100 fL    MCH 34.9 (H) 26.5 - 33.0 pg    MCHC 33.8 31.5 - 36.5 g/dL    RDW 12.1 10.0 - 15.0 %    Platelet Count 136 (L) 150 - 450 10e3/uL    % Neutrophils 82 %    % Lymphocytes 9 %    % Monocytes 7 %    % Eosinophils 2 %    % Basophils 0 %    % Immature Granulocytes 1 %    NRBCs per 100 WBC 0 <1 /100    Absolute Neutrophils 6.8 1.6 - 8.3 10e3/uL    Absolute Lymphocytes 0.7 (L) 0.8 - 5.3 10e3/uL    Absolute Monocytes 0.6 0.0 - 1.3 10e3/uL    Absolute Eosinophils 0.2 0.0 - 0.7 10e3/uL    Absolute Basophils 0.0 0.0 - 0.2 10e3/uL    Absolute Immature Granulocytes 0.1 <=0.4 10e3/uL    Absolute NRBCs 0.0 10e3/uL   CBC with platelets   Result Value Ref Range    WBC Count 7.1 4.0 - 11.0 10e3/uL    RBC Count 3.86 3.80 - 5.20 10e6/uL    Hemoglobin 13.4 11.7 - 15.7 g/dL    Hematocrit 40.0 35.0 - 47.0 %     (H) 78 - 100 fL    MCH 34.7 (H) 26.5 - 33.0 pg    MCHC 33.5 31.5 - 36.5 g/dL    RDW 12.5 10.0 - 15.0 %    Platelet Count 133 (L) 150 - 450 10e3/uL   Extra Tube    Narrative    The following orders were created for panel order Extra Tube.  Procedure                               Abnormality         Status                     ---------                               -----------         ------                     Extra Green Top (Lithiu...[[id number]]                      Final result                 Please view results for these tests on the individual orders.   Extra Green Top (Lithium Heparin) Tube   Result Value Ref Range    Hold Specimen JIC        Medications   senna-docusate (SENOKOT-S/PERICOLACE) 8.6-50 MG per tablet 1 tablet (has no administration in time range)     Or   senna-docusate (SENOKOT-S/PERICOLACE) 8.6-50 MG per tablet 2 tablet (has no administration  in time range)   ondansetron (ZOFRAN ODT) ODT tab 4 mg (has no administration in time range)     Or   ondansetron (ZOFRAN) injection 4 mg (has no administration in time range)   prochlorperazine (COMPAZINE) injection 5 mg (has no administration in time range)     Or   prochlorperazine (COMPAZINE) tablet 5 mg (has no administration in time range)   Contraindications to both pharmacological and mechanical prophylaxis (must document contraindications for both in this order) ( Does not apply $Given 7/13/25 0904)   acetaminophen (TYLENOL) tablet 650 mg (has no administration in time range)     Or   acetaminophen (TYLENOL) Suppository 650 mg (has no administration in time range)   melatonin tablet 1 mg (has no administration in time range)   docusate sodium (COLACE) capsule 100 mg (100 mg Oral $Given 7/13/25 0908)   LORazepam (ATIVAN) tablet 0.5 mg (0.5 mg Oral $Given 7/12/25 2139)   metoprolol succinate ER (TOPROL XL) 24 hr tablet 25 mg (25 mg Oral $Given 7/13/25 0908)   furosemide (LASIX) tablet 20 mg (20 mg Oral $Given 7/13/25 0908)   HYDROmorphone (STANDARD CONC) (DILAUDID) oral solution 0.5 mg (0.5 mg Oral $Given 7/13/25 0453)   naloxone (NARCAN) injection 0.1 mg (has no administration in time range)   bisacodyl (DULCOLAX) suppository 10 mg (has no administration in time range)   sennosides (SENOKOT) tablet 1 tablet (has no administration in time range)   artificial tears (GENTEAL) 0.1-0.2-0.3 % ophthalmic solution 1-2 drop (has no administration in time range)   mineral oil-hydrophilic petrolatum (AQUAPHOR) (has no administration in time range)   HYDROmorphone (STANDARD CONC) (DILAUDID) oral solution 1 mg (has no administration in time range)     Or   HYDROmorphone (DILAUDID) half-tab 1 mg (has no administration in time range)   HYDROmorphone (STANDARD CONC) (DILAUDID) oral solution 2 mg (has no administration in time range)     Or   HYDROmorphone (DILAUDID) tablet 2 mg (has no administration in time range)    acetaminophen (TYLENOL) tablet 650 mg (has no administration in time range)   OLANZapine zydis (zyPREXA) ODT tab 5 mg (has no administration in time range)   atropine 1 % ophthalmic solution 2 drop (has no administration in time range)       Assessments & Plan (with Medical Decision Making)   Initial blood pressure 182/122, rest of vitals are reassuring.  I discussed this case with her hospice team, they would like her evaluated from a trauma perspective today to evaluate for any new injuries that may change her hospice treatment plan.  When asked at bedside she states she does not have any particular pain.  I could not elicit any pain on secondary survey.    We were told that the patient is anticoagulated however upon EMR review we have clarified that she was recently taken off of her Coumadin June 29, 2025 after an ER visit that noted a subarachnoid hemorrhage after a fall.  This has been roughly 13 days since.  Her INR is now normal today.    The CT scan of her head does show traumatic injuries.  The rest of CT imaging today is reassuring.  CT head does show multiple areas of hemorrhage including a larger right-sided subdural hematoma with midline shift, smaller left-sided subdural.  Reviewed this finding with neurosurgery along with family members and the hospice team.  Family reiterates that she is currently not seeking surgical intervention and is not interested in transfer to a facility with neurosurgery capabilities.  I did have a conversation with our neurosurgical team about this case and her goals of care.  Their current recommendations are repeat head CT for monitoring and clinical observation.  No other medical therapies would be helpful in this case.  She remains at her baseline mental status during her time in the ER.  She has met with her hospitalist team and they are agreeable to monitor her overnight    I have reviewed the nursing notes.    I have reviewed the findings, diagnosis, plan and need  for follow up with the patient.        Current Discharge Medication List          Final diagnoses:   Subdural hematoma (H)       7/12/2025   [facility] EMERGENCY DEPT       Francisco Tompkins MD  07/13/25 1027

## 2025-07-13 VITALS
RESPIRATION RATE: 16 BRPM | SYSTOLIC BLOOD PRESSURE: 118 MMHG | WEIGHT: 153.44 LBS | BODY MASS INDEX: 24.08 KG/M2 | HEIGHT: 67 IN | TEMPERATURE: 100.5 F | HEART RATE: 62 BPM | DIASTOLIC BLOOD PRESSURE: 54 MMHG | OXYGEN SATURATION: 92 %

## 2025-07-13 LAB
ERYTHROCYTE [DISTWIDTH] IN BLOOD BY AUTOMATED COUNT: 12.5 % (ref 10–15)
HCT VFR BLD AUTO: 40 % (ref 35–47)
HGB BLD-MCNC: 13.4 G/DL (ref 11.7–15.7)
HOLD SPECIMEN: NORMAL
MCH RBC QN AUTO: 34.7 PG (ref 26.5–33)
MCHC RBC AUTO-ENTMCNC: 33.5 G/DL (ref 31.5–36.5)
MCV RBC AUTO: 104 FL (ref 78–100)
PLATELET # BLD AUTO: 133 10E3/UL (ref 150–450)
RBC # BLD AUTO: 3.86 10E6/UL (ref 3.8–5.2)
WBC # BLD AUTO: 7.1 10E3/UL (ref 4–11)

## 2025-07-13 PROCEDURE — 250N000013 HC RX MED GY IP 250 OP 250 PS 637: Performed by: INTERNAL MEDICINE

## 2025-07-13 PROCEDURE — 99418 PROLNG IP/OBS E/M EA 15 MIN: CPT | Performed by: NURSE PRACTITIONER

## 2025-07-13 PROCEDURE — 99233 SBSQ HOSP IP/OBS HIGH 50: CPT | Performed by: NURSE PRACTITIONER

## 2025-07-13 PROCEDURE — G0378 HOSPITAL OBSERVATION PER HR: HCPCS

## 2025-07-13 PROCEDURE — 36415 COLL VENOUS BLD VENIPUNCTURE: CPT | Performed by: INTERNAL MEDICINE

## 2025-07-13 PROCEDURE — 85027 COMPLETE CBC AUTOMATED: CPT | Performed by: INTERNAL MEDICINE

## 2025-07-13 PROCEDURE — 250N000013 HC RX MED GY IP 250 OP 250 PS 637: Performed by: HOSPITALIST

## 2025-07-13 RX ORDER — HYDROMORPHONE HYDROCHLORIDE 1 MG/ML
2 SOLUTION ORAL
Refills: 0 | Status: DISCONTINUED | OUTPATIENT
Start: 2025-07-13 | End: 2025-07-14 | Stop reason: HOSPADM

## 2025-07-13 RX ORDER — ACETAMINOPHEN 325 MG/1
650 TABLET ORAL EVERY 6 HOURS PRN
Status: DISCONTINUED | OUTPATIENT
Start: 2025-07-13 | End: 2025-07-14 | Stop reason: HOSPADM

## 2025-07-13 RX ORDER — NALOXONE HYDROCHLORIDE 0.4 MG/ML
0.2 INJECTION, SOLUTION INTRAMUSCULAR; INTRAVENOUS; SUBCUTANEOUS
Status: DISCONTINUED | OUTPATIENT
Start: 2025-07-13 | End: 2025-07-13

## 2025-07-13 RX ORDER — NALOXONE HYDROCHLORIDE 0.4 MG/ML
0.1 INJECTION, SOLUTION INTRAMUSCULAR; INTRAVENOUS; SUBCUTANEOUS
Status: DISCONTINUED | OUTPATIENT
Start: 2025-07-13 | End: 2025-07-14 | Stop reason: HOSPADM

## 2025-07-13 RX ORDER — GLIPIZIDE 10 MG/1
1-2 TABLET ORAL
Status: DISCONTINUED | OUTPATIENT
Start: 2025-07-13 | End: 2025-07-14 | Stop reason: HOSPADM

## 2025-07-13 RX ORDER — MINERAL OIL/HYDROPHIL PETROLAT
OINTMENT (GRAM) TOPICAL
Status: DISCONTINUED | OUTPATIENT
Start: 2025-07-13 | End: 2025-07-14 | Stop reason: HOSPADM

## 2025-07-13 RX ORDER — BISACODYL 10 MG
10 SUPPOSITORY, RECTAL RECTAL
Status: DISCONTINUED | OUTPATIENT
Start: 2025-07-16 | End: 2025-07-14 | Stop reason: HOSPADM

## 2025-07-13 RX ORDER — OLANZAPINE 5 MG/1
5 TABLET, ORALLY DISINTEGRATING ORAL EVERY 6 HOURS PRN
Status: DISCONTINUED | OUTPATIENT
Start: 2025-07-13 | End: 2025-07-14 | Stop reason: HOSPADM

## 2025-07-13 RX ORDER — HYDROMORPHONE HYDROCHLORIDE 1 MG/ML
1 SOLUTION ORAL
Refills: 0 | Status: DISCONTINUED | OUTPATIENT
Start: 2025-07-13 | End: 2025-07-14 | Stop reason: HOSPADM

## 2025-07-13 RX ORDER — SENNOSIDES 8.6 MG
1 TABLET ORAL 2 TIMES DAILY PRN
Status: DISCONTINUED | OUTPATIENT
Start: 2025-07-13 | End: 2025-07-14 | Stop reason: HOSPADM

## 2025-07-13 RX ORDER — ATROPINE SULFATE 10 MG/ML
2 SOLUTION/ DROPS OPHTHALMIC EVERY 4 HOURS PRN
Status: DISCONTINUED | OUTPATIENT
Start: 2025-07-13 | End: 2025-07-14 | Stop reason: HOSPADM

## 2025-07-13 RX ORDER — HYDROMORPHONE HYDROCHLORIDE 2 MG/1
2 TABLET ORAL
Refills: 0 | Status: DISCONTINUED | OUTPATIENT
Start: 2025-07-13 | End: 2025-07-14 | Stop reason: HOSPADM

## 2025-07-13 RX ADMIN — HYDROMORPHONE HYDROCHLORIDE 0.5 MG: 1 SOLUTION ORAL at 23:37

## 2025-07-13 RX ADMIN — HYDROMORPHONE HYDROCHLORIDE 0.5 MG: 1 SOLUTION ORAL at 04:53

## 2025-07-13 RX ADMIN — METOPROLOL SUCCINATE 25 MG: 25 TABLET, EXTENDED RELEASE ORAL at 09:08

## 2025-07-13 RX ADMIN — DOCUSATE SODIUM 100 MG: 100 CAPSULE, LIQUID FILLED ORAL at 09:08

## 2025-07-13 RX ADMIN — FUROSEMIDE 20 MG: 20 TABLET ORAL at 09:08

## 2025-07-13 ASSESSMENT — ACTIVITIES OF DAILY LIVING (ADL)
ADLS_ACUITY_SCORE: 57
ADLS_ACUITY_SCORE: 43
DEPENDENT_IADLS:: CLEANING;COOKING;LAUNDRY;SHOPPING;MEAL PREPARATION;MEDICATION MANAGEMENT;MONEY MANAGEMENT;TRANSPORTATION;INCONTINENCE
ADLS_ACUITY_SCORE: 57
ADLS_ACUITY_SCORE: 43
ADLS_ACUITY_SCORE: 57
ADLS_ACUITY_SCORE: 43

## 2025-07-13 NOTE — ED NOTES
ED Nursing criteria listed below was addressed during verbal handoff:     Abnormal vitals: Yes  Abnormal results: Yes  Med Reconciliation completed: Yes  Meds given in ED: N/A  Any Overdue Meds: N/A  Core Measures: Yes  Isolation: N/A  Special needs: Yes  Skin assessment: Yes    Observation Patient  Education provided: Yes

## 2025-07-13 NOTE — PROGRESS NOTES
"PRIMARY DIAGNOSIS: \"GENERIC\" NURSING  OUTPATIENT/OBSERVATION GOALS TO BE MET BEFORE DISCHARGE:  ADLs back to baseline: No    Activity and level of assistance: Up with maximum assistance. Consider SW and/or PT evaluation.     Pain status: Improved-controlled with oral pain medications.    Return to near baseline physical activity: No    Pt is alert only to self. Tried to get out of bed but unable to stand or follow commands. Incontinent of urine x 2. Dilaudid given x 2. Pt states she doesn't have pain, but has been restless, grimaces with turning, and says \"ow.\"       Discharge Planner Nurse   Safe discharge environment identified: Yes  Barriers to discharge: No       Entered by: Shakira Jung RN 07/13/2025 6:18 AM     Please review provider order for any additional goals.   Nurse to notify provider when observation goals have been met and patient is ready for discharge.  "

## 2025-07-13 NOTE — CONSULTS
Care Management Initial Consult    General Information  Assessment completed with: Milena Mello  Type of CM/SW Visit: Initial Assessment    Primary Care Provider verified and updated as needed: Yes   Readmission within the last 30 days: unable to assess      Reason for Consult: discharge planning  Advance Care Planning:          Communication Assessment  Patient's communication style: spoken language (English or Bilingual)    Hearing Difficulty or Deaf: no   Wear Glasses or Blind: no    Cognitive  Cognitive/Neuro/Behavioral: .WDL except, level of consciousness, mood/behavior, orientation, speech  Level of Consciousness: confused  Arousal Level: opens eyes spontaneously  Orientation: disoriented to, place, time, situation  Mood/Behavior: agitated, restless     Speech: illogical    Living Environment:   People in home: facility resident     Current living Arrangements: assisted living  Name of Facility: [facility]   Able to return to prior arrangements: yes     Family/Social Support:  Care provided by: other (see comments) (facility staff, hospice staff)  Provides care for: no one, unable/limited ability to care for self  Marital Status:   Support system: Children          Description of Support System: Supportive, Involved    Support Assessment: Adequate family and caregiver support, Adequate social supports    Current Resources:   Patient receiving home care services: No     Community Resources: Hospice  Equipment currently used at home: hospital bed  Supplies currently used at home: None    Employment/Financial:  Employment Status: retired        Financial Concerns: none      Does the patient's insurance plan have a 3 day qualifying hospital stay waiver?  No    Lifestyle & Psychosocial Needs:  Social Drivers of Health     Food Insecurity: Low Risk  (7/12/2025)    Food Insecurity     Within the past 12 months, did you worry that your food would run out before you got money to buy  more?: No     Within the past 12 months, did the food you bought just not last and you didn t have money to get more?: No   Depression: Not at risk (5/10/2024)    [facility] from [facility]    PHQ-2     PHQ-2 TOTAL SCORE: 2   Housing Stability: Low Risk  (7/12/2025)    Housing Stability     Do you have housing? : Yes     Are you worried about losing your housing?: No   Tobacco Use: Medium Risk (4/10/2025)    Received from [facility]    Patient History     Smoking Tobacco Use: Former     Smokeless Tobacco Use: Never     Passive Exposure: Never   Financial Resource Strain: Low Risk  (7/12/2025)    Financial Resource Strain     Within the past 12 months, have you or your family members you live with been unable to get utilities (heat, electricity) when it was really needed?: No   Alcohol Use: Not on file   Transportation Needs: Low Risk  (7/12/2025)    Transportation Needs     Within the past 12 months, has lack of transportation kept you from medical appointments, getting your medicines, non-medical meetings or appointments, work, or from getting things that you need?: No   Physical Activity: Not on file   Interpersonal Safety: Not on file   Stress: Not on file   Social Connections: Socially Integrated (6/29/2025)    Received from [facility]    Social Connections     Do you often feel lonely or isolated from those around you?: 0   Recent Concern: Social Connections - Socially Isolated (3/31/2025)    Received from [facility]    Social Connections     Do you often feel lonely or isolated from those around you?: 4   Health Literacy: Not on file     Functional Status:  Prior to admission patient needed assistance:   Dependent ADLs:: Bathing, Dressing, Eating, Grooming, Positioning, Wheelchair-with assist, Transfers, Toileting  Dependent IADLs:: Cleaning, Cooking, Laundry, Shopping, Meal  Preparation, Medication Management, Money Management, Transportation, Incontinence     Mental Health Status:  Mental Health Status: No Current Concerns       Chemical Dependency Status:  Chemical Dependency Status: No Current Concerns           Values/Beliefs:  Spiritual, Cultural Beliefs, [employer] Practices, Values that affect care: no             Discussed  Partnership in Safe Discharge Planning  document with patient/family: No      Additional Information:  Care Management received referral to assist with discharge planning, hospice. Initial assessment completed with patient's sons Dino and Abdiel.     Patient resides at [facility] P: [phone] [facility] in the memory care unit. Patient is current with [facility] (Phone: [phone] Fax: [phone])  but family is unhappy with the care they have provided. They are planning on signing off of [facility] and signing onto [facility]. Their father used [facility] and they were very happy with the care provided.  CM sent email to Obdulia Rdz with [facility] to reach out to the family to discuss. CM advised that switching hospice providers may take several days and patient can discharge back to [facility] while waiting for hospice switch - sons understood.     CM attempted to call [facility] x 2 - no answer.       Plan: Return to [facility] with [facility]  Transport: FAUSTINO Davis  Inpatient Care Coordinator   [facility] [phone]  [facility] [phone]

## 2025-07-13 NOTE — PROGRESS NOTES
Resting in bed at this time. Both sons in room sitting next to bed. All needs met, no needs at this time. Patient able to pivot to toilet with x2 assist. Alert and orientated x1 to self. VSS. Continued comfort cares.

## 2025-07-13 NOTE — PROGRESS NOTES
"S-(situation): Patient registered to Observation. Patient arrived to room 270 via cart from ED    B-(background): Pt here for bilateral subdural hematomas.     A-(assessment): Pt is alert only to self. VSS. Afebrile. Pt states she is not in pain but does yell \"ow\" when turned in bed. Scattered bruising.     R-(recommendations): Orders and observation goals reviewed with patient's family.     Nursing Observation criteria listed below was met:    Skin issues/needs documented:Yes  Isolation needs addressed and Signage up: NA  Fall Prevention: Education given and documented: Yes  Education Assessment documented:Yes  Admission Education Documented: Yes  New medication patient education completed and documented (Possible Side Effects of Common Medications handout): Yes  OBS video/handout Reviewed & Documented: Yes  Allergies Reviewed: Yes  Medication Reconciliation Complete: Yes  Home medications if not able to send immediately home with family stored here: NA  Reminder note placed in discharge instructions of home meds: NA  Patient has discharge needs (If yes, please explain): Yes  Patient discharge preferences addressed and charted on white board:  Yes  Provider notified that patient has arrived to the unit: Yes         "

## 2025-07-13 NOTE — PROGRESS NOTES
[facility]    Medicine Progress Note - Hospitalist Service    Date of Admission:  7/12/2025    Assessment & Plan      Moira Duarte is a 79 year old female with advanced dementia, paroxysmal atrial fibrillation, CAD s/p CABG, PAD, CVA, CHF, diabetes mellitus admitted to observation on 7/12/2025 for overnight monitoring of acute bilateral subdural hematomas, and evolving subacute subarachnoid hemorrhage.  The patient is on hospice, and family wants no surgical intervention. Patient admitted to hospitalist for further management.       Bilateral subdural hematomas  Evolving subacute subarachnoid hemorrhage  Recurrent falls  - CT brain with acute bilateral subdural hematomas, R>L, evolving focal subacute subarachnoid hemorrhage left frontal lobe  - Patient is on hospice, family does not want surgical intervention  - will not repeat head CT, discussed this was family, patient is under a hospice care plan prior to arrival.  - comfort focused care     Comfort care hospice   -comfort care protocol place   -discussed with patient's son and sounds as though much miscommunication in her care plan, etc.. he confirms hospice and wants focus to be on comfort only   -case management consult, anticipate patient having greater needs than long term on hospice and needs an inpatient hospice plan perhaps LTC.  -further, patent's family (sons) are seeking an alternative hospice agency help accommodate their mother's needs and appreciate jose maria    Advanced dementia  On hospice care  - Family does not want any medications other than those for comfort and/or agitation  - continue OP hydromorphone  - Continue lorazepam  - Continue fluoxetine  - Limit vitals to Q shift    Chronic systolic heart failure  Pulmonary hypertension  Atrial fibrillation  CAD s/p CABG  PAD  CVA  - Empagliflozin, lisinopril, warfarin discontinued patient's last hospitalization  - Continue metoprolol for rate control for comfort   - Continue  Lasix for CHF comfort     Type 2 diabetes mellitus  - Insulin and empagliflozin discontinued last hospitalization  - No POC glucose testing       Observation Goals: -diagnostic tests and consults completed and resulted, -vital signs normal or at patient baseline, Nurse to notify provider when observation goals have been met and patient is ready for discharge.  Diet: Regular Diet Adult    DVT Prophylaxis: bleed, contraindicated, on hospice care   Lee Catheter: Not present  Lines: None     Cardiac Monitoring: None  Code Status: No CPR- Do NOT Intubate      Clinically Significant Risk Factors Present on Admission               # Hypoalbuminemia: Lowest albumin = 3.4 g/dL at 7/12/2025  5:30 PM, will monitor as appropriate                          Social Drivers of Health    Tobacco Use: Medium Risk (4/10/2025)    Received from [employer]    Patient History     Smoking Tobacco Use: Former     Smokeless Tobacco Use: Never     Passive Exposure: Never   Social Connections: Socially Integrated (6/29/2025)    Received from [employer]    Social Connections     Do you often feel lonely or isolated from those around you?: 0   Recent Concern: Social Connections - Socially Isolated (3/31/2025)    Received from [employer]    Social Connections     Do you often feel lonely or isolated from those around you?: 4          Disposition Plan     Medically Ready for Discharge: Anticipated in 2-4 Days  Ultimately, needs higher level care fr          The patient's care was discussed with the Bedside Nurse, Care Coordinator/, and Patient's Family.    Shavonne Abdi DNP MSN AG/ACNP-BC  Hospitalist Service  [facility]  Securely message with [employer] (more info)  Text page via [employer] Paging/Directory   ______________________________________________________________________    Interval History    Patient is seen in her room with nursing at bedside. She is alert, very pleasantly confused, appears in no distress. She is not having pain at the time I see her. I discussed patient with her son Dino who confirmed wanting comfort measures only also very pleasant. He is considering alternate hospice agency or services as there has been some disconnect in patient's plan of care. Social work is consulted as well. Seems to be overall stable on a SDH standpoint, will monitor symptoms this evening as if she does suddenly worsen this would also change plan as she would not be appropriate for [facility].     Physical Exam   Vital Signs: Temp: 97.8  F (36.6  C) Temp src: Oral BP: (!) 140/52 Pulse: 64   Resp: 18 SpO2: 97 % O2 Device: None (Room air)    Weight: 153 lbs 7.04 oz    Physical Exam  Constitutional:       General: She is not in acute distress.  Neurological:      General: No focal deficit present.      Mental Status: She is alert.   Psychiatric:         Mood and Affect: Mood normal.          Medical Decision Making       68 MINUTES SPENT BY ME on the date of service doing chart review, history, exam, documentation & further activities per the note.      Data     I have personally reviewed the following data over the past 24 hrs:    7.1  \   13.4   / 133 (L)     139 100 8.5 /  187 (H)   3.8 26 0.57 \     ALT: 12 AST: 24 AP: 214 (H) TBILI: 0.5   ALB: 3.4 (L) TOT PROTEIN: 7.4 LIPASE: N/A     INR:  1.09 PTT:  N/A   D-dimer:  N/A Fibrinogen:  N/A       Imaging results reviewed over the past 24 hrs:   Recent Results (from the past 24 hours)   Head CT w/o contrast   Result Value    Rad Flag-Addendum Acute intracranial hemorrhage (AA)    Addendum: 7/12/2025    ADDENDUM:    [Critical Result: Acute intracranial hemorrhage]    Finding was identified on 7/12/2025 5:25 PM CDT.     Dr. Tompkins was contacted by me on 7/12/2025 5:32 PM CDT and verbalized understanding of the critical result.     END ADDENDUM      Narrative    EXAM:  CT HEAD W/O CONTRAST, CT CERVICAL SPINE W/O CONTRAST  LOCATION: [facility]  DATE: 7/12/2025    INDICATION: fall, dementia  COMPARISON: CT cervical spine dated 05/27/2025 and head CT dated 06/27/2025  TECHNIQUE:   1) Routine CT Head without IV contrast. Multiplanar reformats. Dose reduction techniques were used.  2) Routine CT Cervical Spine without IV contrast. Multiplanar reformats. Dose reduction techniques were used.    FINDINGS:   HEAD CT:   INTRACRANIAL CONTENTS: New acute right-sided subdural hematoma measures up to 13 mm in diameter. Associated mass effect with flattening of the underlying sulci and 4 mm of leftward midline shift. New acute left-sided subdural hematoma measures up to 6 mm   in diameter. Evolving focal subacute subarachnoid hemorrhage along the paramedian left frontal lobe has has increased in volume but decreased in attenuation since the prior study. Decreasing trace subarachnoid hemorrhage along the anterior left frontal   lobe. No parenchymal hemorrhage. No CT evidence of acute infarct. Moderate to severe presumed chronic small vessel ischemic changes. 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 No hydrocephalus. Basilar cisterns are patent. Moderate to marked intracranial   atherosclerotic disease.    VISUALIZED ORBITS/SINUSES/MASTOIDS: Prior bilateral cataract surgery. Visualized portions of the orbits are otherwise unremarkable. Small mucous retention cyst right maxillary sinus and tiny mucous retention cyst left maxillary sinus. No middle ear or   mastoid effusion.    BONES/SOFT TISSUES: Focal scalp hematoma in the left frontal region. No skull fracture.    CERVICAL SPINE CT:   VERTEBRA: Slight degenerative anterolisthesis of C3 on C4. Otherwise normal vertebral alignment. Normal vertebral body heights. Mild degenerative changes between the anterior arch of C1 and the dens. Moderate degenerative disc disease from C5 to C7.   Osteopenia. No fracture or  posttraumatic subluxation.     CANAL/FORAMINA: No central canal stenosis. Moderate left C3-C4, moderate right and mild left C5-C6, and moderate left and mild right C6-C7 foraminal narrowing due to facet hypertrophic changes and uncovertebral osteophytes.    PARASPINAL: Prominent calcified atherosclerotic plaque at the right subclavian artery origin and at both carotid bifurcations. Visualized lung fields are clear.      Impression    IMPRESSION:  HEAD CT:  1.  New acute bilateral subdural hematomas, right greater than left.  2.  Associated mass effect with 4 mm of leftward midline shift.  3.  Evolving focal subacute subarachnoid hemorrhage along the paramedian left frontal lobe has increased in volume but decreased in attenuation.  4.  Decreasing trace subarachnoid hemorrhage along the anterior left frontal lobe.  5.  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    CERVICAL SPINE CT:  1.  No CT evidence for acute fracture or post traumatic subluxation.  2.  Degenerative changes as described above.     CT Cervical Spine w/o Contrast   Result Value    Rad Flag-Addendum Acute intracranial hemorrhage (AA)    Addendum: 7/12/2025    ADDENDUM:    [Critical Result: Acute intracranial hemorrhage]    Finding was identified on 7/12/2025 5:25 PM CDT.     Dr. Tompkins was contacted by me on 7/12/2025 5:32 PM CDT and verbalized understanding of the critical result.     END ADDENDUM      Narrative    EXAM: CT HEAD W/O CONTRAST, CT CERVICAL SPINE W/O CONTRAST  LOCATION: [facility]  DATE: 7/12/2025    INDICATION: fall, dementia  COMPARISON: CT cervical spine dated 05/27/2025 and head CT dated 06/27/2025  TECHNIQUE:   1) Routine CT Head without IV contrast. Multiplanar reformats. Dose reduction techniques were used.  2) Routine CT Cervical Spine without IV contrast. Multiplanar reformats. Dose reduction techniques were used.    FINDINGS:   HEAD  CT:   INTRACRANIAL CONTENTS: New acute right-sided subdural hematoma measures up to 13 mm in diameter. Associated mass effect with flattening of the underlying sulci and 4 mm of leftward midline shift. New acute left-sided subdural hematoma measures up to 6 mm   in diameter. Evolving focal subacute subarachnoid hemorrhage along the paramedian left frontal lobe has has increased in volume but decreased in attenuation since the prior study. Decreasing trace subarachnoid hemorrhage along the anterior left frontal   lobe. No parenchymal hemorrhage. No CT evidence of acute infarct. Moderate to severe presumed chronic small vessel ischemic changes. 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 No hydrocephalus. Basilar cisterns are patent. Moderate to marked intracranial   atherosclerotic disease.    VISUALIZED ORBITS/SINUSES/MASTOIDS: Prior bilateral cataract surgery. Visualized portions of the orbits are otherwise unremarkable. Small mucous retention cyst right maxillary sinus and tiny mucous retention cyst left maxillary sinus. No middle ear or   mastoid effusion.    BONES/SOFT TISSUES: Focal scalp hematoma in the left frontal region. No skull fracture.    CERVICAL SPINE CT:   VERTEBRA: Slight degenerative anterolisthesis of C3 on C4. Otherwise normal vertebral alignment. Normal vertebral body heights. Mild degenerative changes between the anterior arch of C1 and the dens. Moderate degenerative disc disease from C5 to C7.   Osteopenia. No fracture or posttraumatic subluxation.     CANAL/FORAMINA: No central canal stenosis. Moderate left C3-C4, moderate right and mild left C5-C6, and moderate left and mild right C6-C7 foraminal narrowing due to facet hypertrophic changes and uncovertebral osteophytes.    PARASPINAL: Prominent calcified atherosclerotic plaque at the right subclavian artery origin and at both carotid bifurcations. Visualized lung fields are clear.      Impression    IMPRESSION:  HEAD CT:  1.  New acute bilateral  subdural hematomas, right greater than left.  2.  Associated mass effect with 4 mm of leftward midline shift.  3.  Evolving focal subacute subarachnoid hemorrhage along the paramedian left frontal lobe has increased in volume but decreased in attenuation.  4.  Decreasing trace subarachnoid hemorrhage along the anterior left frontal lobe.  5.  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    CERVICAL SPINE CT:  1.  No CT evidence for acute fracture or post traumatic subluxation.  2.  Degenerative changes as described above.     CT Thoracic Spine Reconstructed    Narrative    EXAM: CT LUMBAR SPINE RECONSTRUCTED, CT THORACIC SPINE RECONSTRUCTED  LOCATION: [facility]  DATE: 07/12/2025    INDICATION: Fall. Dementia.  COMPARISON: CT abdomen and pelvis dated 06/27/2025.  TECHNIQUE:  1) Routine CT Thoracic Spine without IV contrast. Multiplanar reformats. Dose reduction techniques were used.   2) Routine CT Lumbar Spine without IV contrast. Multiplanar reformats. Dose reduction techniques were used.     FINDINGS: Patient motion markedly degrades evaluation. Within these confines:      THORACIC SPINE CT:  VERTEBRA: Decreased osseous mineralization. Multilevel flowing anterolateral osteophytes/syndesmophytes noted throughout the thoracic spine. Thoracic spine vertebral body heights are maintained. There is minimal dextroconvex curvature of the thoracic   spine. No definite acute fracture or posttraumatic subluxation.    CANAL/FORAMINA: No high-grade canal or neural foraminal stenosis.    PARASPINAL: No extraspinal abnormality.      LUMBAR SPINE CT:  VERTEBRA: Decreased osseous mineralization. Stable vertebral body heights. At L4-L5 there is stable grade 1 anterolisthesis measuring approximately 3-4 mm. There is mild levoconvex curvature of the lumbar spine. No definite acute fracture or   posttraumatic subluxation.     CANAL/FORAMINA:  Advanced multilevel degenerative changes of the lumbar spine. At L2-L3 there is mild canal stenosis. At L3-L4 there is severe canal stenosis. At L4-L5 there is mild canal stenosis. At L5-S1 there is no high-grade canal stenosis, however,   there is a disc osteophyte complex, eccentric to the left lateral recess causing severe left lateral recess stenosis with apparent contact of the descending left S1 cauda equina nerve root. On the left at L3-L4 there is severe neural foraminal stenosis.   On the left at L4-L5 there is severe neural foraminal stenosis. On the left at L5-S1 there is moderate neural foraminal stenosis. On the right at L3-L4 there is severe neural foraminal stenosis. On the right at L4-L5 there is severe neural foraminal   stenosis. On the right at L5-S1 there is moderate to severe neural foraminal stenosis.    PARASPINAL: No extraspinal abnormality.        Impression    IMPRESSION:  THORACIC SPINE CT:  1.  No definite acute fracture or posttraumatic subluxation.  2.  No high-grade spinal canal or neural foraminal stenosis.    LUMBAR SPINE CT:  1.  No definite acute fracture or posttraumatic subluxation.  2.  Advanced multilevel degenerative changes of the lumbar spine, as above.   CT Lumbar Spine Reconstructed    Narrative    EXAM: CT LUMBAR SPINE RECONSTRUCTED, CT THORACIC SPINE RECONSTRUCTED  LOCATION: [facility]  DATE: 07/12/2025    INDICATION: Fall. Dementia.  COMPARISON: CT abdomen and pelvis dated 06/27/2025.  TECHNIQUE:  1) Routine CT Thoracic Spine without IV contrast. Multiplanar reformats. Dose reduction techniques were used.   2) Routine CT Lumbar Spine without IV contrast. Multiplanar reformats. Dose reduction techniques were used.     FINDINGS: Patient motion markedly degrades evaluation. Within these confines:      THORACIC SPINE CT:  VERTEBRA: Decreased osseous mineralization. Multilevel flowing anterolateral osteophytes/syndesmophytes noted throughout  the thoracic spine. Thoracic spine vertebral body heights are maintained. There is minimal dextroconvex curvature of the thoracic   spine. No definite acute fracture or posttraumatic subluxation.    CANAL/FORAMINA: No high-grade canal or neural foraminal stenosis.    PARASPINAL: No extraspinal abnormality.      LUMBAR SPINE CT:  VERTEBRA: Decreased osseous mineralization. Stable vertebral body heights. At L4-L5 there is stable grade 1 anterolisthesis measuring approximately 3-4 mm. There is mild levoconvex curvature of the lumbar spine. No definite acute fracture or   posttraumatic subluxation.     CANAL/FORAMINA: Advanced multilevel degenerative changes of the lumbar spine. At L2-L3 there is mild canal stenosis. At L3-L4 there is severe canal stenosis. At L4-L5 there is mild canal stenosis. At L5-S1 there is no high-grade canal stenosis, however,   there is a disc osteophyte complex, eccentric to the left lateral recess causing severe left lateral recess stenosis with apparent contact of the descending left S1 cauda equina nerve root. On the left at L3-L4 there is severe neural foraminal stenosis.   On the left at L4-L5 there is severe neural foraminal stenosis. On the left at L5-S1 there is moderate neural foraminal stenosis. On the right at L3-L4 there is severe neural foraminal stenosis. On the right at L4-L5 there is severe neural foraminal   stenosis. On the right at L5-S1 there is moderate to severe neural foraminal stenosis.    PARASPINAL: No extraspinal abnormality.        Impression    IMPRESSION:  THORACIC SPINE CT:  1.  No definite acute fracture or posttraumatic subluxation.  2.  No high-grade spinal canal or neural foraminal stenosis.    LUMBAR SPINE CT:  1.  No definite acute fracture or posttraumatic subluxation.  2.  Advanced multilevel degenerative changes of the lumbar spine, as above.   CT Chest Abdomen Pelvis w/o Contrast    Narrative    EXAM: CT CHEST ABDOMEN PELVIS W/O CONTRAST  LOCATION: [facility]  DATE: 07/12/2025    INDICATION: Fall, dementia.  COMPARISON: None.  TECHNIQUE: CT scan of the chest, abdomen, and pelvis was performed without IV contrast. Multiplanar reformats were obtained. Dose reduction techniques were used.   CONTRAST: None.    FINDINGS:   LUNGS AND PLEURA: No pneumothorax or focal infiltrate.    MEDIASTINUM/AXILLAE: Normal.    CORONARY ARTERY CALCIFICATION: Previous intervention (stents or CABG).    HEPATOBILIARY: Cirrhotic configuration of the liver. Cholelithiasis.    PANCREAS: Normal.    SPLEEN: Normal.    ADRENAL GLANDS: Normal.    KIDNEYS/BLADDER: 2 mm stone lower pole left kidney. No other stones.    BOWEL: Colonic diverticulosis without evidence for diverticulitis.    LYMPH NODES: Normal.    VASCULATURE: Atherosclerotic plaque throughout the aorta.    PELVIC ORGANS: Normal.    MUSCULOSKELETAL: Degenerative change in the spine. No fractures identified. Calcification of the interspinous ligament thoracic and upper lumbar spine.      Impression    IMPRESSION:  1.  No evidence for acute traumatic injury in the chest, abdomen, or pelvis.

## 2025-07-13 NOTE — H&P
[facility]    History and Physical - Hospitalist Service       Date of Admission:  7/12/2025    Assessment & Plan      Moira Duarte is a 79 year old female with advanced dementia, paroxysmal atrial fibrillation, CAD s/p CABG, PAD, CVA, CHF, diabetes mellitus admitted to observation on 7/12/2025 for overnight monitoring of acute bilateral subdural hematomas, and evolving subacute subarachnoid hemorrhage.  The patient is on hospice, and family wants no surgical intervention, but would like the patient monitored to assess if subdural bleeding is ongoing and may result in patient's imminent demise.  They are clear they want no surgical interventions, and would like patient return to her facility tomorrow    Bilateral subdural hematomas  Evolving subacute subarachnoid hemorrhage  Recurrent falls  - CT brain with acute bilateral subdural hematomas, R>L, evolving focal subacute subarachnoid hemorrhage left frontal lobe  - Patient is on hospice, family does not want surgical intervention  - Repeat CT in the morning  - Dilaudid as needed for pain    Advanced dementia  On hospice care  - Family does not want any medications other than those for comfort and/or agitation  - continue OP hydromorphone  - Continue lorazepam  - Continue fluoxetine  - Limit vitals to Q shift    Chronic systolic heart failure  Pulmonary hypertension  Atrial fibrillation  CAD s/p CABG  PAD  CVA  - Empagliflozin, lisinopril, warfarin discontinued patient's last hospitalization  - Continue metoprolol for rate control  - Continue Lasix for CHF    Type 2 diabetes mellitus  - Insulin and empagliflozin discontinued last hospitalization  - No POC glucose testing        Diet:  Regular  DVT Prophylaxis: None.  Lee Catheter: Not present  Lines: None     Cardiac Monitoring: None  Code Status:  DNR/DNI    Clinically Significant Risk Factors Present on Admission               # Hypoalbuminemia: Lowest albumin = 3.4 g/dL at  7/12/2025  5:30 PM, will monitor as appropriate              # DMII: A1C = 6.9 % (Ref range: <5.7 %) within past 6 months               Disposition Plan     Medically Ready for Discharge: Anticipated Tomorrow           Norma Roberts MD  Hospitalist Service  [facility]  Securely message with [employer] (more info)  Text page via [facility] Paging/Directory     ______________________________________________________________________    Chief Complaint   Fall with neck pain    History is obtained from the electronic health record, emergency department physician, and patient's son    History of Present Illness   Moira Duarte is a 79 year old female with a history of advanced dementia paroxysmal atrial fibrillation, CAD s/p CABG, PAD, CVA, CHF, pulmonary hypertension, diabetes mellitus admitted to observation on 7/12/2025 for overnight monitoring of acute bilateral subdural hematomas, and evolving subacute subarachnoid hemorrhage.  Had a lengthy discussion regarding care options and management with the patient's son, Dino, at bedside.  The patient is on hospice, and family wants no surgical intervention, but would like the patient monitored to assess if subdural bleeding is ongoing and may result in patient's imminent demise.  They want comfort medications and interventions only.  They are clear they want no surgical interventions, and would like patient return to her facility tomorrow      Past Medical History    No past medical history on file.    Past Surgical History   No past surgical history on file.    Prior to Admission Medications   None        Review of Systems    Review of systems not obtained due to patient factors - confusion     Physical Exam   Vital Signs: Temp: 97.8  F (36.6  C) Temp src: Oral BP: (!) 175/75 Pulse: 70   Resp: 20 SpO2: 99 % O2 Device: None (Room air)    Weight: 163 lbs 0 oz    Constitutional: Awake, NAD  Eyes: PERRL, normal conjunctiva  ENT: Trachea midline, no  adenopathy  Respiratory: Breath sounds present bilaterally, no wheezing  Cardiovascular: RR, + murmur  Gastrointestinal: Soft, bowel sounds present  Musculoskeletal: No tenderness or edema  Skin: Warm and dry.  Extensive bruising along face and neck  Neurological: Alert, pleasantly confused and disoriented, no focal deficits  Psychiatric: Unable to assess      Medical Decision Making       55 MINUTES SPENT BY ME on the date of service doing chart review, history, exam, documentation & further activities per the note.      Data     I have personally reviewed the following data over the past 24 hrs:    8.3  \   14.5   / 136 (L)     139 100 8.5 /  187 (H)   3.8 26 0.57 \     ALT: 12 AST: 24 AP: 214 (H) TBILI: 0.5   ALB: 3.4 (L) TOT PROTEIN: 7.4 LIPASE: N/A     INR:  1.09 PTT:  N/A   D-dimer:  N/A Fibrinogen:  N/A       Imaging results reviewed over the past 24 hrs:   Recent Results (from the past 24 hours)   Head CT w/o contrast   Result Value    Rad Flag-Addendum Acute intracranial hemorrhage (AA)    Addendum: 7/12/2025    ADDENDUM:    [Critical Result: Acute intracranial hemorrhage]    Finding was identified on 7/12/2025 5:25 PM CDT.     Dr. Tompkins was contacted by me on 7/12/2025 5:32 PM CDT and verbalized understanding of the critical result.     END ADDENDUM      Narrative    EXAM: CT HEAD W/O CONTRAST, CT CERVICAL SPINE W/O CONTRAST  LOCATION: [facility]  DATE: 7/12/2025    INDICATION: fall, dementia  COMPARISON: CT cervical spine dated 05/27/2025 and head CT dated 06/27/2025  TECHNIQUE:   1) Routine CT Head without IV contrast. Multiplanar reformats. Dose reduction techniques were used.  2) Routine CT Cervical Spine without IV contrast. Multiplanar reformats. Dose reduction techniques were used.    FINDINGS:   HEAD CT:   INTRACRANIAL CONTENTS: New acute right-sided subdural hematoma measures up to 13 mm in diameter. Associated mass effect with flattening of the underlying sulci  and 4 mm of leftward midline shift. New acute left-sided subdural hematoma measures up to 6 mm   in diameter. Evolving focal subacute subarachnoid hemorrhage along the paramedian left frontal lobe has has increased in volume but decreased in attenuation since the prior study. Decreasing trace subarachnoid hemorrhage along the anterior left frontal   lobe. No parenchymal hemorrhage. No CT evidence of acute infarct. Moderate to severe presumed chronic small vessel ischemic changes. 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 No hydrocephalus. Basilar cisterns are patent. Moderate to marked intracranial   atherosclerotic disease.    VISUALIZED ORBITS/SINUSES/MASTOIDS: Prior bilateral cataract surgery. Visualized portions of the orbits are otherwise unremarkable. Small mucous retention cyst right maxillary sinus and tiny mucous retention cyst left maxillary sinus. No middle ear or   mastoid effusion.    BONES/SOFT TISSUES: Focal scalp hematoma in the left frontal region. No skull fracture.    CERVICAL SPINE CT:   VERTEBRA: Slight degenerative anterolisthesis of C3 on C4. Otherwise normal vertebral alignment. Normal vertebral body heights. Mild degenerative changes between the anterior arch of C1 and the dens. Moderate degenerative disc disease from C5 to C7.   Osteopenia. No fracture or posttraumatic subluxation.     CANAL/FORAMINA: No central canal stenosis. Moderate left C3-C4, moderate right and mild left C5-C6, and moderate left and mild right C6-C7 foraminal narrowing due to facet hypertrophic changes and uncovertebral osteophytes.    PARASPINAL: Prominent calcified atherosclerotic plaque at the right subclavian artery origin and at both carotid bifurcations. Visualized lung fields are clear.      Impression    IMPRESSION:  HEAD CT:  1.  New acute bilateral subdural hematomas, right greater than left.  2.  Associated mass effect with 4 mm of leftward midline shift.  3.  Evolving focal subacute subarachnoid hemorrhage  along the paramedian left frontal lobe has increased in volume but decreased in attenuation.  4.  Decreasing trace subarachnoid hemorrhage along the anterior left frontal lobe.  5.  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    CERVICAL SPINE CT:  1.  No CT evidence for acute fracture or post traumatic subluxation.  2.  Degenerative changes as described above.     CT Cervical Spine w/o Contrast   Result Value    Rad Flag-Addendum Acute intracranial hemorrhage (AA)    Addendum: 7/12/2025    ADDENDUM:    [Critical Result: Acute intracranial hemorrhage]    Finding was identified on 7/12/2025 5:25 PM CDT.     Dr. Tompkins was contacted by me on 7/12/2025 5:32 PM CDT and verbalized understanding of the critical result.     END ADDENDUM      Narrative    EXAM: CT HEAD W/O CONTRAST, CT CERVICAL SPINE W/O CONTRAST  LOCATION: [facility]  DATE: 7/12/2025    INDICATION: fall, dementia  COMPARISON: CT cervical spine dated 05/27/2025 and head CT dated 06/27/2025  TECHNIQUE:   1) Routine CT Head without IV contrast. Multiplanar reformats. Dose reduction techniques were used.  2) Routine CT Cervical Spine without IV contrast. Multiplanar reformats. Dose reduction techniques were used.    FINDINGS:   HEAD CT:   INTRACRANIAL CONTENTS: New acute right-sided subdural hematoma measures up to 13 mm in diameter. Associated mass effect with flattening of the underlying sulci and 4 mm of leftward midline shift. New acute left-sided subdural hematoma measures up to 6 mm   in diameter. Evolving focal subacute subarachnoid hemorrhage along the paramedian left frontal lobe has has increased in volume but decreased in attenuation since the prior study. Decreasing trace subarachnoid hemorrhage along the anterior left frontal   lobe. No parenchymal hemorrhage. No CT evidence of acute infarct. Moderate to severe presumed chronic small vessel ischemic  changes. 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 No hydrocephalus. Basilar cisterns are patent. Moderate to marked intracranial   atherosclerotic disease.    VISUALIZED ORBITS/SINUSES/MASTOIDS: Prior bilateral cataract surgery. Visualized portions of the orbits are otherwise unremarkable. Small mucous retention cyst right maxillary sinus and tiny mucous retention cyst left maxillary sinus. No middle ear or   mastoid effusion.    BONES/SOFT TISSUES: Focal scalp hematoma in the left frontal region. No skull fracture.    CERVICAL SPINE CT:   VERTEBRA: Slight degenerative anterolisthesis of C3 on C4. Otherwise normal vertebral alignment. Normal vertebral body heights. Mild degenerative changes between the anterior arch of C1 and the dens. Moderate degenerative disc disease from C5 to C7.   Osteopenia. No fracture or posttraumatic subluxation.     CANAL/FORAMINA: No central canal stenosis. Moderate left C3-C4, moderate right and mild left C5-C6, and moderate left and mild right C6-C7 foraminal narrowing due to facet hypertrophic changes and uncovertebral osteophytes.    PARASPINAL: Prominent calcified atherosclerotic plaque at the right subclavian artery origin and at both carotid bifurcations. Visualized lung fields are clear.      Impression    IMPRESSION:  HEAD CT:  1.  New acute bilateral subdural hematomas, right greater than left.  2.  Associated mass effect with 4 mm of leftward midline shift.  3.  Evolving focal subacute subarachnoid hemorrhage along the paramedian left frontal lobe has increased in volume but decreased in attenuation.  4.  Decreasing trace subarachnoid hemorrhage along the anterior left frontal lobe.  5.  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    CERVICAL SPINE CT:  1.  No CT evidence for acute fracture or post traumatic subluxation.  2.  Degenerative changes as described above.     CT Thoracic Spine Reconstructed    Narrative     EXAM: CT LUMBAR SPINE RECONSTRUCTED, CT THORACIC SPINE RECONSTRUCTED  LOCATION: [facility]  DATE: 07/12/2025    INDICATION: Fall. Dementia.  COMPARISON: CT abdomen and pelvis dated 06/27/2025.  TECHNIQUE:  1) Routine CT Thoracic Spine without IV contrast. Multiplanar reformats. Dose reduction techniques were used.   2) Routine CT Lumbar Spine without IV contrast. Multiplanar reformats. Dose reduction techniques were used.     FINDINGS: Patient motion markedly degrades evaluation. Within these confines:      THORACIC SPINE CT:  VERTEBRA: Decreased osseous mineralization. Multilevel flowing anterolateral osteophytes/syndesmophytes noted throughout the thoracic spine. Thoracic spine vertebral body heights are maintained. There is minimal dextroconvex curvature of the thoracic   spine. No definite acute fracture or posttraumatic subluxation.    CANAL/FORAMINA: No high-grade canal or neural foraminal stenosis.    PARASPINAL: No extraspinal abnormality.      LUMBAR SPINE CT:  VERTEBRA: Decreased osseous mineralization. Stable vertebral body heights. At L4-L5 there is stable grade 1 anterolisthesis measuring approximately 3-4 mm. There is mild levoconvex curvature of the lumbar spine. No definite acute fracture or   posttraumatic subluxation.     CANAL/FORAMINA: Advanced multilevel degenerative changes of the lumbar spine. At L2-L3 there is mild canal stenosis. At L3-L4 there is severe canal stenosis. At L4-L5 there is mild canal stenosis. At L5-S1 there is no high-grade canal stenosis, however,   there is a disc osteophyte complex, eccentric to the left lateral recess causing severe left lateral recess stenosis with apparent contact of the descending left S1 cauda equina nerve root. On the left at L3-L4 there is severe neural foraminal stenosis.   One the left at L4-L5 there is severe neural foraminal stenosis. On the left at L5-S1 there is moderate neural foraminal stenosis. On the right  at L3-L4 there is severe neural foraminal stenosis. On the right at L4-L5 there is severe neural foraminal   stenosis. On the right at L5-S1 there is moderate to severe neural foraminal stenosis.    PARASPINAL: No extraspinal abnormality.        Impression    IMPRESSION:  THORACIC SPINE CT:  1.  No definite acute fracture or posttraumatic subluxation.  2.  No high-grade spinal canal or neural foraminal stenosis.    LUMBAR SPINE CT:  1.  No definite acute fracture or posttraumatic subluxation.  2.  Advanced multilevel degenerative changes of the lumbar spine, as above.   CT Lumbar Spine Reconstructed    Narrative    EXAM: CT LUMBAR SPINE RECONSTRUCTED, CT THORACIC SPINE RECONSTRUCTED  LOCATION: [facility]  DATE: 07/12/2025    INDICATION: Fall. Dementia.  COMPARISON: CT abdomen and pelvis dated 06/27/2025.  TECHNIQUE:  1) Routine CT Thoracic Spine without IV contrast. Multiplanar reformats. Dose reduction techniques were used.   2) Routine CT Lumbar Spine without IV contrast. Multiplanar reformats. Dose reduction techniques were used.     FINDINGS: Patient motion markedly degrades evaluation. Within these confines:      THORACIC SPINE CT:  VERTEBRA: Decreased osseous mineralization. Multilevel flowing anterolateral osteophytes/syndesmophytes noted throughout the thoracic spine. Thoracic spine vertebral body heights are maintained. There is minimal dextroconvex curvature of the thoracic   spine. No definite acute fracture or posttraumatic subluxation.    CANAL/FORAMINA: No high-grade canal or neural foraminal stenosis.    PARASPINAL: No extraspinal abnormality.      LUMBAR SPINE CT:  VERTEBRA: Decreased osseous mineralization. Stable vertebral body heights. At L4-L5 there is stable grade 1 anterolisthesis measuring approximately 3-4 mm. There is mild levoconvex curvature of the lumbar spine. No definite acute fracture or   posttraumatic subluxation.     CANAL/FORAMINA: Advanced  multilevel degenerative changes of the lumbar spine. At L2-L3 there is mild canal stenosis. At L3-L4 there is severe canal stenosis. At L4-L5 there is mild canal stenosis. At L5-S1 there is no high-grade canal stenosis, however,   there is a disc osteophyte complex, eccentric to the left lateral recess causing severe left lateral recess stenosis with apparent contact of the descending left S1 cauda equina nerve root. On the left at L3-L4 there is severe neural foraminal stenosis.   One the left at L4-L5 there is severe neural foraminal stenosis. On the left at L5-S1 there is moderate neural foraminal stenosis. On the right at L3-L4 there is severe neural foraminal stenosis. On the right at L4-L5 there is severe neural foraminal   stenosis. On the right at L5-S1 there is moderate to severe neural foraminal stenosis.    PARASPINAL: No extraspinal abnormality.        Impression    IMPRESSION:  THORACIC SPINE CT:  1.  No definite acute fracture or posttraumatic subluxation.  2.  No high-grade spinal canal or neural foraminal stenosis.    LUMBAR SPINE CT:  1.  No definite acute fracture or posttraumatic subluxation.  2.  Advanced multilevel degenerative changes of the lumbar spine, as above.   CT Chest Abdomen Pelvis w/o Contrast    Narrative    EXAM: CT CHEST ABDOMEN PELVIS W/O CONTRAST  LOCATION: [facility]  DATE: 07/12/2025    INDICATION: Fall, dementia.  COMPARISON: None.  TECHNIQUE: CT scan of the chest, abdomen, and pelvis was performed without IV contrast. Multiplanar reformats were obtained. Dose reduction techniques were used.   CONTRAST: None.    FINDINGS:   LUNGS AND PLEURA: No pneumothorax or focal infiltrate.    MEDIASTINUM/AXILLAE: Normal.    CORONARY ARTERY CALCIFICATION: Previous intervention (stents or CABG).    HEPATOBILIARY: Cirrhotic configuration of the liver. Cholelithiasis.    PANCREAS: Normal.    SPLEEN: Normal.    ADRENAL GLANDS: Normal.    KIDNEYS/BLADDER: 2 mm  stone lower pole left kidney. No other stones.    BOWEL: Colonic diverticulosis without evidence for diverticulitis.    LYMPH NODES: Normal.    VASCULATURE: Atherosclerotic plaque throughout the aorta.    PELVIC ORGANS: Normal.    MUSCULOSKELETAL: Degenerative change in the spine. No fractures identified. Calcification of the interspinous ligament thoracic and upper lumbar spine.      Impression    IMPRESSION:  1.  No evidence for acute traumatic injury in the chest, abdomen, or pelvis.

## 2025-07-13 NOTE — PLAN OF CARE
Goal Outcome Evaluation:      Plan of Care Reviewed With: child          Outcome Evaluation: Return to [facility] memory care [facility] with hospice care

## 2025-07-14 PROCEDURE — 99239 HOSP IP/OBS DSCHRG MGMT >30: CPT | Performed by: NURSE PRACTITIONER

## 2025-07-14 PROCEDURE — 99207 PR NO BILLABLE SERVICE THIS VISIT: CPT | Performed by: STUDENT IN AN ORGANIZED HEALTH CARE EDUCATION/TRAINING PROGRAM

## 2025-07-14 PROCEDURE — 250N000013 HC RX MED GY IP 250 OP 250 PS 637: Performed by: INTERNAL MEDICINE

## 2025-07-14 PROCEDURE — 250N000013 HC RX MED GY IP 250 OP 250 PS 637: Performed by: NURSE PRACTITIONER

## 2025-07-14 PROCEDURE — G0378 HOSPITAL OBSERVATION PER HR: HCPCS

## 2025-07-14 RX ORDER — HYDROMORPHONE HYDROCHLORIDE 2 MG/1
1 TABLET ORAL EVERY 6 HOURS PRN
Qty: 6 TABLET | Refills: 0 | Status: SHIPPED | OUTPATIENT
Start: 2025-07-14 | End: 2025-07-17

## 2025-07-14 RX ADMIN — HYDROMORPHONE HYDROCHLORIDE 1 MG: 1 SOLUTION ORAL at 01:22

## 2025-07-14 RX ADMIN — DOCUSATE SODIUM 100 MG: 100 CAPSULE, LIQUID FILLED ORAL at 09:09

## 2025-07-14 RX ADMIN — FUROSEMIDE 20 MG: 20 TABLET ORAL at 09:09

## 2025-07-14 RX ADMIN — METOPROLOL SUCCINATE 25 MG: 25 TABLET, EXTENDED RELEASE ORAL at 09:09

## 2025-07-14 ASSESSMENT — ACTIVITIES OF DAILY LIVING (ADL)
ADLS_ACUITY_SCORE: 58

## 2025-07-14 NOTE — PROGRESS NOTES
Care Management Discharge Note    Discharge Date: 07/14/2025     Discharge Disposition: Assisted Living, Hospice    Discharge Services:  (Hospice)    Discharge DME: None    Discharge Transportation: agency    Private pay costs discussed: transportation costs    Does the patient's insurance plan have a 3 day qualifying hospital stay waiver?  No    Patient/family educated on Medicare website which has current facility and service quality ratings: yes    Education Provided on the Discharge Plan: Yes  Persons Notified of Discharge Plans: Son Dino, Facility RN Christin  Patient/Family in Agreement with the Plan: yes    Handoff Referral Completed: No, handoff not indicated or clinically appropriate      Additional Information:  Per provider, patient is medically ready to discharge today.     Patient will discharge back to [facility] P: [phone] [facility] today. Patient will discharge back on [facility] hospice for now, but will plan on discharging from their services this evening and signing onto [facility] (Phone: [phone] Fax: [phone]) tomorrow. Confirmed this plan with patient's son Dino and Obdulia Rdz from [facility]. Facility is also aware.     Transport via stretcher around 10 am. Family aware of potential private pay cost of transport.       FAUSTINO Quesada  Inpatient Care Coordinator   [facility] [phone]  [facility] [phone]

## 2025-07-14 NOTE — PLAN OF CARE
Goal Outcome Evaluation:      Plan of Care Reviewed With: patient      Pt oriented only to self, nonsensical speech, difficult to follow. Restless at times. Pt does follow commands. Pleasant and often smiling, denies pain, appears comfortable. Became scared when needing clean linens and incontinence care, though was smiling and pleasant immediately after. Ate about 50% of dinner. Taking off her clothes at times and needing assistance to redress.

## 2025-07-14 NOTE — PLAN OF CARE
Goal Outcome Evaluation:                      Returning to [facility] living on hospice and comfort cares via EMS stretcher/transport, [place] packed up and sent with her in a belongings bag.

## 2025-07-14 NOTE — PROGRESS NOTES
S-(situation): Patient discharged to [facility] via EMS stretcher transport.    B-(background): Observation goals met.    A-(assessment): Patient had morning meds Colace, lasix and metoprolol this morning, changed into a clean brief before discharge. Did notice redness on her bottom and groin area, barrier cream applied. Patient did eat some breakfast with assistance this morning.     R-(recommendations): Listed belongings gathered and returned to patient.  Patient Education resolved: Yes  New medications-Pt. Has been educated about reason of use and side effects NA  Home medications returned to patient NA  Medication Bin checked and emptied on discharge Yes    Report called to [facility], sent to a message machine, short message left that she was on her return way back to them and they could call me for a further report if necessary.

## 2025-07-14 NOTE — DISCHARGE SUMMARY
[facility]  Hospitalist Discharge Summary      Date of Admission:  7/12/2025  Date of Discharge:  7/14/2025  Discharging Provider: Shavonne Abdi DNP MSN AG/ACNP-BC   Discharge Service: Hospitalist Service    Discharge Diagnoses   Bilateral subdural hematomas  Evolving subacute subarachnoid hemorrhage  Recurrent falls  Comfort care hospice   Advanced dementia   Chronic systolic heart failure  Pulmonary hypertension  Atrial fibrillation  CAD s/p CABG  PAD  CVA  Type 2 diabetes mellitus     Clinically Significant Risk Factors          Follow-ups Needed After Discharge   Hospice care        Discharge Disposition   Discharged to [facility], hospice    Condition at discharge: Terminal    Hospital Course   Moira Duarte is a 79 year old female with advanced dementia, paroxysmal atrial fibrillation, CAD s/p CABG, PAD, CVA, CHF, diabetes mellitus admitted to observation on 7/12/2025 for overnight monitoring of acute bilateral subdural hematomas, and evolving subacute subarachnoid hemorrhage.  The patient is on hospice, and family wants no surgical intervention. Patient admitted to hospitalist for further management.     Patient placed on comfort care orderset while admitted, discussed with patient's sons and will discharge back to [facility] on hospice care continued       Bilateral subdural hematomas  Evolving subacute subarachnoid hemorrhage  Recurrent falls  - CT brain with acute bilateral subdural hematomas, R>L, evolving focal subacute subarachnoid hemorrhage left frontal lobe  - Patient is on hospice, family does not want surgical intervention  - will not repeat head CT, discussed this was family, patient is under a hospice care plan prior to arrival.  - comfort focused care back to [facility] with hospice    Comfort care hospice   -comfort care protocol place   -discussed with patient's son and sounds as though much miscommunication in her care plan, etc.. he confirms hospice and wants focus to be on comfort  only   -case management consult, anticipate patient having greater needs than skilled nursing on hospice and needs an inpatient hospice plan perhaps LTC.  -further, patent's family (sons) are seeking an alternative hospice agency help accommodate their mother's needs     Advanced dementia  On hospice care  - Family does not want any medications other than those for comfort and/or agitation  - continue OP hydromorphone  - Continue lorazepam  - Continue fluoxetine  - Limit vitals to Q shift    Chronic systolic heart failure  Pulmonary hypertension  Atrial fibrillation  CAD s/p CABG  PAD  CVA  - Empagliflozin, lisinopril, warfarin discontinued patient's last hospitalization  - Continue metoprolol for rate control for comfort   - Continue Lasix for CHF comfort     Type 2 diabetes mellitus  - Insulin and empagliflozin discontinued last hospitalization  - No POC glucose testing    Consultations This Hospital Stay   SPIRITUAL HEALTH SERVICES IP CONSULT  CARE MANAGEMENT / SOCIAL WORK IP CONSULT    Code Status   No CPR- Do NOT Intubate    Time Spent on this Encounter   I, Shavonne Abdi DNP MSN AG/ACNP-BC , personally saw the patient today and spent greater than 30 minutes discharging this patient.       Shavonne Abdi DNP MSN AG/ACNP-BC   [address] MEDICAL SURGICAL  [address] [place] [address]  Phone: [phone]  ______________________________________________________________________    Physical Exam   Vital Signs: Temp: 100.5  F (38.1  C) Temp src: Oral BP: 118/54 Pulse: 62   Resp: 16 SpO2: 92 % O2 Device: None (Room air)    Weight: 153 lbs 7.04 oz  Physical Exam  Constitutional:       General: She is not in acute distress.            Primary Care Physician   Mc Maier    Discharge Orders   No discharge procedures on file.    Significant Results and Procedures   Results for orders placed or performed during the hospital encounter of 07/12/25   Head CT w/o contrast     Value    Rad Flag-Addendum Acute  intracranial hemorrhage (AA)    Addendum: 7/12/2025    ADDENDUM:    [Critical Result: Acute intracranial hemorrhage]    Finding was identified on 7/12/2025 5:25 PM CDT.     Dr. Tompkins was contacted by me on 7/12/2025 5:32 PM CDT and verbalized understanding of the critical result.     END ADDENDUM      Narrative    EXAM: CT HEAD W/O CONTRAST, CT CERVICAL SPINE W/O CONTRAST  LOCATION: [facility]  DATE: 7/12/2025    INDICATION: fall, dementia  COMPARISON: CT cervical spine dated 05/27/2025 and head CT dated 06/27/2025  TECHNIQUE:   1) Routine CT Head without IV contrast. Multiplanar reformats. Dose reduction techniques were used.  2) Routine CT Cervical Spine without IV contrast. Multiplanar reformats. Dose reduction techniques were used.    FINDINGS:   HEAD CT:   INTRACRANIAL CONTENTS: New acute right-sided subdural hematoma measures up to 13 mm in diameter. Associated mass effect with flattening of the underlying sulci and 4 mm of leftward midline shift. New acute left-sided subdural hematoma measures up to 6 mm   in diameter. Evolving focal subacute subarachnoid hemorrhage along the paramedian left frontal lobe has has increased in volume but decreased in attenuation since the prior study. Decreasing trace subarachnoid hemorrhage along the anterior left frontal   lobe. No parenchymal hemorrhage. No CT evidence of acute infarct. Moderate to severe presumed chronic small vessel ischemic changes. 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 No hydrocephalus. Basilar cisterns are patent. Moderate to marked intracranial   atherosclerotic disease.    VISUALIZED ORBITS/SINUSES/MASTOIDS: Prior bilateral cataract surgery. Visualized portions of the orbits are otherwise unremarkable. Small mucous retention cyst right maxillary sinus and tiny mucous retention cyst left maxillary sinus. No middle ear or   mastoid effusion.    BONES/SOFT TISSUES: Focal scalp hematoma in the left frontal region. No  skull fracture.    CERVICAL SPINE CT:   VERTEBRA: Slight degenerative anterolisthesis of C3 on C4. Otherwise normal vertebral alignment. Normal vertebral body heights. Mild degenerative changes between the anterior arch of C1 and the dens. Moderate degenerative disc disease from C5 to C7.   Osteopenia. No fracture or posttraumatic subluxation.     CANAL/FORAMINA: No central canal stenosis. Moderate left C3-C4, moderate right and mild left C5-C6, and moderate left and mild right C6-C7 foraminal narrowing due to facet hypertrophic changes and uncovertebral osteophytes.    PARASPINAL: Prominent calcified atherosclerotic plaque at the right subclavian artery origin and at both carotid bifurcations. Visualized lung fields are clear.      Impression    IMPRESSION:  HEAD CT:  1.  New acute bilateral subdural hematomas, right greater than left.  2.  Associated mass effect with 4 mm of leftward midline shift.  3.  Evolving focal subacute subarachnoid hemorrhage along the paramedian left frontal lobe has increased in volume but decreased in attenuation.  4.  Decreasing trace subarachnoid hemorrhage along the anterior left frontal lobe.  5.  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    CERVICAL SPINE CT:  1.  No CT evidence for acute fracture or post traumatic subluxation.  2.  Degenerative changes as described above.     CT Cervical Spine w/o Contrast     Value    Rad Flag-Addendum Acute intracranial hemorrhage (AA)    Addendum: 7/12/2025    ADDENDUM:    [Critical Result: Acute intracranial hemorrhage]    Finding was identified on 7/12/2025 5:25 PM CDT.     Dr. Tompkins was contacted by me on 7/12/2025 5:32 PM CDT and verbalized understanding of the critical result.     END ADDENDUM      Narrative    EXAM: CT HEAD W/O CONTRAST, CT CERVICAL SPINE W/O CONTRAST  LOCATION: [facility]  DATE: 7/12/2025    INDICATION: fall,  dementia  COMPARISON: CT cervical spine dated 05/27/2025 and head CT dated 06/27/2025  TECHNIQUE:   1) Routine CT Head without IV contrast. Multiplanar reformats. Dose reduction techniques were used.  2) Routine CT Cervical Spine without IV contrast. Multiplanar reformats. Dose reduction techniques were used.    FINDINGS:   HEAD CT:   INTRACRANIAL CONTENTS: New acute right-sided subdural hematoma measures up to 13 mm in diameter. Associated mass effect with flattening of the underlying sulci and 4 mm of leftward midline shift. New acute left-sided subdural hematoma measures up to 6 mm   in diameter. Evolving focal subacute subarachnoid hemorrhage along the paramedian left frontal lobe has has increased in volume but decreased in attenuation since the prior study. Decreasing trace subarachnoid hemorrhage along the anterior left frontal   lobe. No parenchymal hemorrhage. No CT evidence of acute infarct. Moderate to severe presumed chronic small vessel ischemic changes. 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 No hydrocephalus. Basilar cisterns are patent. Moderate to marked intracranial   atherosclerotic disease.    VISUALIZED ORBITS/SINUSES/MASTOIDS: Prior bilateral cataract surgery. Visualized portions of the orbits are otherwise unremarkable. Small mucous retention cyst right maxillary sinus and tiny mucous retention cyst left maxillary sinus. No middle ear or   mastoid effusion.    BONES/SOFT TISSUES: Focal scalp hematoma in the left frontal region. No skull fracture.    CERVICAL SPINE CT:   VERTEBRA: Slight degenerative anterolisthesis of C3 on C4. Otherwise normal vertebral alignment. Normal vertebral body heights. Mild degenerative changes between the anterior arch of C1 and the dens. Moderate degenerative disc disease from C5 to C7.   Osteopenia. No fracture or posttraumatic subluxation.     CANAL/FORAMINA: No central canal stenosis. Moderate left C3-C4, moderate right and mild left C5-C6, and moderate left and  mild right C6-C7 foraminal narrowing due to facet hypertrophic changes and uncovertebral osteophytes.    PARASPINAL: Prominent calcified atherosclerotic plaque at the right subclavian artery origin and at both carotid bifurcations. Visualized lung fields are clear.      Impression    IMPRESSION:  HEAD CT:  1.  New acute bilateral subdural hematomas, right greater than left.  2.  Associated mass effect with 4 mm of leftward midline shift.  3.  Evolving focal subacute subarachnoid hemorrhage along the paramedian left frontal lobe has increased in volume but decreased in attenuation.  4.  Decreasing trace subarachnoid hemorrhage along the anterior left frontal lobe.  5.  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    CERVICAL SPINE CT:  1.  No CT evidence for acute fracture or post traumatic subluxation.  2.  Degenerative changes as described above.     CT Chest Abdomen Pelvis w/o Contrast    Narrative    EXAM: CT CHEST ABDOMEN PELVIS W/O CONTRAST  LOCATION: [facility]  DATE: 07/12/2025    INDICATION: Fall, dementia.  COMPARISON: None.  TECHNIQUE: CT scan of the chest, abdomen, and pelvis was performed without IV contrast. Multiplanar reformats were obtained. Dose reduction techniques were used.   CONTRAST: None.    FINDINGS:   LUNGS AND PLEURA: No pneumothorax or focal infiltrate.    MEDIASTINUM/AXILLAE: Normal.    CORONARY ARTERY CALCIFICATION: Previous intervention (stents or CABG).    HEPATOBILIARY: Cirrhotic configuration of the liver. Cholelithiasis.    PANCREAS: Normal.    SPLEEN: Normal.    ADRENAL GLANDS: Normal.    KIDNEYS/BLADDER: 2 mm stone lower pole left kidney. No other stones.    BOWEL: Colonic diverticulosis without evidence for diverticulitis.    LYMPH NODES: Normal.    VASCULATURE: Atherosclerotic plaque throughout the aorta.    PELVIC ORGANS: Normal.    MUSCULOSKELETAL: Degenerative change in the spine. No  fractures identified. Calcification of the interspinous ligament thoracic and upper lumbar spine.      Impression    IMPRESSION:  1.  No evidence for acute traumatic injury in the chest, abdomen, or pelvis.   CT Lumbar Spine Reconstructed    Narrative    EXAM: CT LUMBAR SPINE RECONSTRUCTED, CT THORACIC SPINE RECONSTRUCTED  LOCATION: [facility]  DATE: 07/12/2025    INDICATION: Fall. Dementia.  COMPARISON: CT abdomen and pelvis dated 06/27/2025.  TECHNIQUE:  1) Routine CT Thoracic Spine without IV contrast. Multiplanar reformats. Dose reduction techniques were used.   2) Routine CT Lumbar Spine without IV contrast. Multiplanar reformats. Dose reduction techniques were used.     FINDINGS: Patient motion markedly degrades evaluation. Within these confines:      THORACIC SPINE CT:  VERTEBRA: Decreased osseous mineralization. Multilevel flowing anterolateral osteophytes/syndesmophytes noted throughout the thoracic spine. Thoracic spine vertebral body heights are maintained. There is minimal dextroconvex curvature of the thoracic   spine. No definite acute fracture or posttraumatic subluxation.    CANAL/FORAMINA: No high-grade canal or neural foraminal stenosis.    PARASPINAL: No extraspinal abnormality.      LUMBAR SPINE CT:  VERTEBRA: Decreased osseous mineralization. Stable vertebral body heights. At L4-L5 there is stable grade 1 anterolisthesis measuring approximately 3-4 mm. There is mild levoconvex curvature of the lumbar spine. No definite acute fracture or   posttraumatic subluxation.     CANAL/FORAMINA: Advanced multilevel degenerative changes of the lumbar spine. At L2-L3 there is mild canal stenosis. At L3-L4 there is severe canal stenosis. At L4-L5 there is mild canal stenosis. At L5-S1 there is no high-grade canal stenosis, however,   there is a disc osteophyte complex, eccentric to the left lateral recess causing severe left lateral recess stenosis with apparent contact of the  descending left S1 cauda equina nerve root. On the left at L3-L4 there is severe neural foraminal stenosis.   On the left at L4-L5 there is severe neural foraminal stenosis. On the left at L5-S1 there is moderate neural foraminal stenosis. On the right at L3-L4 there is severe neural foraminal stenosis. On the right at L4-L5 there is severe neural foraminal   stenosis. On the right at L5-S1 there is moderate to severe neural foraminal stenosis.    PARASPINAL: No extraspinal abnormality.        Impression    IMPRESSION:  THORACIC SPINE CT:  1.  No definite acute fracture or posttraumatic subluxation.  2.  No high-grade spinal canal or neural foraminal stenosis.    LUMBAR SPINE CT:  1.  No definite acute fracture or posttraumatic subluxation.  2.  Advanced multilevel degenerative changes of the lumbar spine, as above.   CT Thoracic Spine Reconstructed    Narrative    EXAM: CT LUMBAR SPINE RECONSTRUCTED, CT THORACIC SPINE RECONSTRUCTED  LOCATION: [facility]  DATE: 07/12/2025    INDICATION: Fall. Dementia.  COMPARISON: CT abdomen and pelvis dated 06/27/2025.  TECHNIQUE:  1) Routine CT Thoracic Spine without IV contrast. Multiplanar reformats. Dose reduction techniques were used.   2) Routine CT Lumbar Spine without IV contrast. Multiplanar reformats. Dose reduction techniques were used.     FINDINGS: Patient motion markedly degrades evaluation. Within these confines:      THORACIC SPINE CT:  VERTEBRA: Decreased osseous mineralization. Multilevel flowing anterolateral osteophytes/syndesmophytes noted throughout the thoracic spine. Thoracic spine vertebral body heights are maintained. There is minimal dextroconvex curvature of the thoracic   spine. No definite acute fracture or posttraumatic subluxation.    CANAL/FORAMINA: No high-grade canal or neural foraminal stenosis.    PARASPINAL: No extraspinal abnormality.      LUMBAR SPINE CT:  VERTEBRA: Decreased osseous mineralization. Stable  vertebral body heights. At L4-L5 there is stable grade 1 anterolisthesis measuring approximately 3-4 mm. There is mild levoconvex curvature of the lumbar spine. No definite acute fracture or   posttraumatic subluxation.     CANAL/FORAMINA: Advanced multilevel degenerative changes of the lumbar spine. At L2-L3 there is mild canal stenosis. At L3-L4 there is severe canal stenosis. At L4-L5 there is mild canal stenosis. At L5-S1 there is no high-grade canal stenosis, however,   there is a disc osteophyte complex, eccentric to the left lateral recess causing severe left lateral recess stenosis with apparent contact of the descending left S1 cauda equina nerve root. On the left at L3-L4 there is severe neural foraminal stenosis.   On the left at L4-L5 there is severe neural foraminal stenosis. On the left at L5-S1 there is moderate neural foraminal stenosis. On the right at L3-L4 there is severe neural foraminal stenosis. On the right at L4-L5 there is severe neural foraminal   stenosis. On the right at L5-S1 there is moderate to severe neural foraminal stenosis.    PARASPINAL: No extraspinal abnormality.        Impression    IMPRESSION:  THORACIC SPINE CT:  1.  No definite acute fracture or posttraumatic subluxation.  2.  No high-grade spinal canal or neural foraminal stenosis.    LUMBAR SPINE CT:  1.  No definite acute fracture or posttraumatic subluxation.  2.  Advanced multilevel degenerative changes of the lumbar spine, as above.       Discharge Medications      Review of your medicines        UNREVIEWED medicines. Ask your doctor about these medicines        Dose / Directions   acetaminophen 500 MG tablet  Commonly known as: TYLENOL      Dose: 1,000 mg  Take 1,000 mg by mouth 3 times daily.  Refills: 0     FLUoxetine 20 MG capsule  Commonly known as: PROzac      Dose: 20 mg  Take 20 mg by mouth daily.  Refills: 0     furosemide 20 MG tablet  Commonly known as: LASIX      Dose: 20 mg  Take 20 mg by mouth  daily.  Refills: 0     Insulin Aspart FlexPen 100 UNIT/ML Sopn      Dose: 6 Units  Inject 6 Units subcutaneously 3 times daily (before meals). Inject 6 units subcutaneous three times daily before meals. Hold for BG <150 at dinner  Refills: 0     Jardiance 10 MG Tabs tablet  Generic drug: empagliflozin      Dose: 10 mg  Take 10 mg by mouth daily.  Refills: 0     LORazepam 0.5 MG tablet  Commonly known as: ATIVAN      Dose: 0.5 mg  Take 0.5 mg by mouth daily.  Refills: 0     metoprolol succinate ER 25 MG 24 hr tablet  Commonly known as: TOPROL XL      Dose: 25 mg  Take 25 mg by mouth daily. Do not give if pulse is less than 50  Refills: 0     OLANZapine 2.5 MG tablet  Commonly known as: zyPREXA      Dose: 2.5 mg  Take 2.5 mg by mouth at bedtime.  Refills: 0     senna 8.6 MG tablet  Commonly known as: SENOKOT      Dose: 8.6 mg  Take 8.6 mg by mouth 2 times daily.  Refills: 0            CONTINUE these medicines which have NOT CHANGED        Dose / Directions   Zinc Oxide 20 % Pste      Dose: 20 %  Apply 20 % topically 2 times daily.  Refills: 0            Allergies   Allergies   Allergen Reactions    Atorvastatin Other (See Comments)     ALLERGIC REACTION:  MUSCLE AND BODY ACHES   (lipitor)    Morphine Itching    Metformin Palpitations

## 2025-07-14 NOTE — PLAN OF CARE
Goal Outcome Evaluation:      Plan of Care Reviewed With: patient    Overall Patient Progress: no changeOverall Patient Progress: no change    Outcome Evaluation: Pt remains on comfort cares. PRN hydromorphone given for pain and comfort. Moving w/A2 for repositioning. Adequate urine output through purewick. Mepilex to coccyx for redness.

## 2025-07-15 ENCOUNTER — PATIENT OUTREACH (OUTPATIENT)
Dept: CARE COORDINATION | Facility: CLINIC | Age: 80
End: 2025-07-15
Payer: COMMERCIAL

## 2025-07-15 NOTE — PROGRESS NOTES
Connected [facility] Resource Center: [facility]    Background: Transitional Care Management program identified per system criteria and reviewed by Conne[facility] Resource [facility] team for possible outreach.    Assessment: Upon chart review, CCRC Team member will not proceed with patient outreach related to this episode of Transitional Care Management program due to reason below:    Non-[facility] TCU: CCRC team member noted patient discharged to TCU/ARU/LTACH. Patient is not established with a [facility] currently supported by Primary Care-Care Coordination therefore handoff to Primary Care-Care Coordination is not appropriate at this time.    Plan: Transitional Care Management episode addressed appropriately per reason noted above.      HAYDEN Antonio  [facility] [place], [facility]    *Connected Care Resource Team does NOT follow patient ongoing. Referrals are identified based on internal discharge reports and the outreach is to ensure patient has an understanding of their discharge instructions.

## 2025-07-26 ENCOUNTER — HEALTH MAINTENANCE LETTER (OUTPATIENT)
Age: 80
End: 2025-07-26